# Patient Record
Sex: FEMALE | Race: WHITE | NOT HISPANIC OR LATINO | Employment: STUDENT | URBAN - METROPOLITAN AREA
[De-identification: names, ages, dates, MRNs, and addresses within clinical notes are randomized per-mention and may not be internally consistent; named-entity substitution may affect disease eponyms.]

---
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
## 2022-08-22 PROBLEM — Z34.90 PREGNANCY: Status: ACTIVE | Noted: 2022-08-22

## 2022-08-22 PROBLEM — R11.2 NAUSEA AND VOMITING: Status: ACTIVE | Noted: 2022-08-22

## 2022-09-02 ENCOUNTER — PROCEDURE VISIT (OUTPATIENT)
Dept: OBSTETRICS AND GYNECOLOGY | Facility: CLINIC | Age: 22
End: 2022-09-02
Payer: COMMERCIAL

## 2022-09-02 ENCOUNTER — INITIAL PRENATAL (OUTPATIENT)
Dept: OBSTETRICS AND GYNECOLOGY | Facility: CLINIC | Age: 22
End: 2022-09-02
Payer: COMMERCIAL

## 2022-09-02 VITALS — DIASTOLIC BLOOD PRESSURE: 72 MMHG | WEIGHT: 143.75 LBS | SYSTOLIC BLOOD PRESSURE: 114 MMHG

## 2022-09-02 DIAGNOSIS — Z34.90 PREGNANCY, UNSPECIFIED GESTATIONAL AGE: Primary | ICD-10-CM

## 2022-09-02 LAB
ABO + RH BLD: NORMAL
ANION GAP SERPL CALC-SCNC: 10 MMOL/L (ref 8–16)
BASOPHILS # BLD AUTO: 0.02 K/UL (ref 0–0.2)
BASOPHILS NFR BLD: 0.3 % (ref 0–1.9)
BLD GP AB SCN CELLS X3 SERPL QL: NORMAL
BUN SERPL-MCNC: 9 MG/DL (ref 6–20)
CALCIUM SERPL-MCNC: 9.3 MG/DL (ref 8.7–10.5)
CHLORIDE SERPL-SCNC: 108 MMOL/L (ref 95–110)
CO2 SERPL-SCNC: 21 MMOL/L (ref 23–29)
CREAT SERPL-MCNC: 0.7 MG/DL (ref 0.5–1.4)
DIFFERENTIAL METHOD: NORMAL
EOSINOPHIL # BLD AUTO: 0 K/UL (ref 0–0.5)
EOSINOPHIL NFR BLD: 0.5 % (ref 0–8)
ERYTHROCYTE [DISTWIDTH] IN BLOOD BY AUTOMATED COUNT: 12.6 % (ref 11.5–14.5)
EST. GFR  (NO RACE VARIABLE): >60 ML/MIN/1.73 M^2
GLUCOSE SERPL-MCNC: 82 MG/DL (ref 70–110)
HCT VFR BLD AUTO: 45 % (ref 37–48.5)
HGB BLD-MCNC: 15.1 G/DL (ref 12–16)
IMM GRANULOCYTES # BLD AUTO: 0.02 K/UL (ref 0–0.04)
IMM GRANULOCYTES NFR BLD AUTO: 0.3 % (ref 0–0.5)
LYMPHOCYTES # BLD AUTO: 1.8 K/UL (ref 1–4.8)
LYMPHOCYTES NFR BLD: 23.8 % (ref 18–48)
MCH RBC QN AUTO: 30.3 PG (ref 27–31)
MCHC RBC AUTO-ENTMCNC: 33.6 G/DL (ref 32–36)
MCV RBC AUTO: 90 FL (ref 82–98)
MONOCYTES # BLD AUTO: 0.4 K/UL (ref 0.3–1)
MONOCYTES NFR BLD: 5.5 % (ref 4–15)
NEUTROPHILS # BLD AUTO: 5.2 K/UL (ref 1.8–7.7)
NEUTROPHILS NFR BLD: 69.6 % (ref 38–73)
NRBC BLD-RTO: 0 /100 WBC
PLATELET # BLD AUTO: 315 K/UL (ref 150–450)
PMV BLD AUTO: 10.1 FL (ref 9.2–12.9)
POTASSIUM SERPL-SCNC: 3.9 MMOL/L (ref 3.5–5.1)
RBC # BLD AUTO: 4.99 M/UL (ref 4–5.4)
SODIUM SERPL-SCNC: 139 MMOL/L (ref 136–145)
WBC # BLD AUTO: 7.4 K/UL (ref 3.9–12.7)

## 2022-09-02 PROCEDURE — 80048 BASIC METABOLIC PNL TOTAL CA: CPT | Performed by: ADVANCED PRACTICE MIDWIFE

## 2022-09-02 PROCEDURE — 99999 PR PBB SHADOW E&M-EST. PATIENT-LVL III: ICD-10-PCS | Mod: PBBFAC,,, | Performed by: ADVANCED PRACTICE MIDWIFE

## 2022-09-02 PROCEDURE — 87086 URINE CULTURE/COLONY COUNT: CPT | Performed by: ADVANCED PRACTICE MIDWIFE

## 2022-09-02 PROCEDURE — 99203 PR OFFICE/OUTPT VISIT, NEW, LEVL III, 30-44 MIN: ICD-10-PCS | Mod: S$GLB,,, | Performed by: ADVANCED PRACTICE MIDWIFE

## 2022-09-02 PROCEDURE — 86850 RBC ANTIBODY SCREEN: CPT | Performed by: ADVANCED PRACTICE MIDWIFE

## 2022-09-02 PROCEDURE — 99999 PR PBB SHADOW E&M-EST. PATIENT-LVL III: CPT | Mod: PBBFAC,,, | Performed by: ADVANCED PRACTICE MIDWIFE

## 2022-09-02 PROCEDURE — 99203 OFFICE O/P NEW LOW 30 MIN: CPT | Mod: S$GLB,,, | Performed by: ADVANCED PRACTICE MIDWIFE

## 2022-09-02 PROCEDURE — 87591 N.GONORRHOEAE DNA AMP PROB: CPT | Performed by: ADVANCED PRACTICE MIDWIFE

## 2022-09-02 PROCEDURE — 86592 SYPHILIS TEST NON-TREP QUAL: CPT | Performed by: ADVANCED PRACTICE MIDWIFE

## 2022-09-02 PROCEDURE — 85025 COMPLETE CBC W/AUTO DIFF WBC: CPT | Performed by: ADVANCED PRACTICE MIDWIFE

## 2022-09-02 PROCEDURE — 87340 HEPATITIS B SURFACE AG IA: CPT | Performed by: ADVANCED PRACTICE MIDWIFE

## 2022-09-02 PROCEDURE — 86762 RUBELLA ANTIBODY: CPT | Performed by: ADVANCED PRACTICE MIDWIFE

## 2022-09-02 PROCEDURE — 86803 HEPATITIS C AB TEST: CPT | Performed by: ADVANCED PRACTICE MIDWIFE

## 2022-09-02 PROCEDURE — 87389 HIV-1 AG W/HIV-1&-2 AB AG IA: CPT | Performed by: ADVANCED PRACTICE MIDWIFE

## 2022-09-02 PROCEDURE — 87491 CHLMYD TRACH DNA AMP PROBE: CPT | Performed by: ADVANCED PRACTICE MIDWIFE

## 2022-09-02 NOTE — PROGRESS NOTES
HISTORY OF PRESENT ILLNESS:    Yenifer Quezada is a 22 y.o. female, ,  Patient's last menstrual period was 2022.  for a routine exam complaining of amenorrhea.   Pt reports her cycles are irregular     History reviewed. No pertinent past medical history.    Past Surgical History:   Procedure Laterality Date    KNEE SURGERY Left 2015    Dr King        MEDICATIONS AND ALLERGIES:      Current Outpatient Medications:     ondansetron (ZOFRAN) 4 MG tablet, Take 1 tablet (4 mg total) by mouth every 6 (six) hours., Disp: 18 tablet, Rfl: 0    prenatal 105-iron-folic ac-dha 30 mg iron- 1.4 mg-300 mg Cmpk, Take by mouth., Disp: , Rfl:     Review of patient's allergies indicates:   Allergen Reactions    Pcn [penicillins] Rash       Family History   Problem Relation Age of Onset    Breast cancer Paternal Grandmother     No Known Problems Father     No Known Problems Mother     No Known Problems Brother     Colon cancer Neg Hx     Ovarian cancer Neg Hx        Social History     Socioeconomic History    Marital status: Single   Tobacco Use    Smoking status: Never    Smokeless tobacco: Never   Substance and Sexual Activity    Alcohol use: No    Drug use: No    Sexual activity: Yes     Partners: Male       COMPREHENSIVE GYN HISTORY:  PAP History: Denies abnormal Paps.  Infection History: Denies STDs. Denies PID.  Benign History: Denies uterine fibroids. Denies ovarian cysts. Denies endometriosis. Denies other conditions.  Cancer History: Denies cervical cancer. Denies uterine cancer or hyperplasia. Denies ovarian cancer. Denies vulvar cancer or pre-cancer. Denies vaginal cancer or pre-cancer. Denies breast cancer. Denies colon cancer.  Sexual Activity History: Reports currently being sexually active  Menstrual History: None.  Contraception: None    ROS:  GENERAL: No weight changes. No swelling. No fatigue. No fever.  CARDIOVASCULAR: No chest pain. No shortness of breath. No leg cramps.    NEUROLOGICAL: No headaches. No vision changes.  BREASTS: No pain. No lumps. No discharge.  ABDOMEN: No pain. No nausea. No vomiting. No diarrhea. No constipation.  REPRODUCTIVE: No abnormal bleeding.   VULVA: No pain. No lesions. No itching.  VAGINA: No relaxation. No itching. No odor. No discharge. No lesions.  URINARY: No incontinence. No nocturia. No frequency. No dysuria.    /72   Wt 65.2 kg (143 lb 11.8 oz)   LMP 2022     PE:  AFFECT: Alert and oriented X 3. Interactive during exam  GENERAL: Appearance well-nourished, well-developed, in no acute distress.  HEENT: WNL  TEETH: Good dentition.  THYROID: No thyromegally   BREASTS: No masses, skin changes, nipple discharge or adenopathy bilaterally.  LUNGS: Easy and unlabored  HEART: Regular rate and rhythm   ABDOMEN: Soft and nontender without masses or organomegally.  EXTREMITIES: No cyanosis, clubbing or edema. No calf tenderness.  SKIN: No lesions or rashes.  LYMPH NODES: No axillary,  or inguinal adenopathy.  VULVA: No lesions, masses or tenderness.  VAGINA: Moist and well rugated without lesions or discharge.  CERVIX: Moist and pink without lesions, discharge or tenderness.      UTERUS SIZE:   week size, nontender and without masses.  ADNEXA: No masses or tenderness.  RECTUM: Deferred.  ESTIMATE OF PELVIC CAPACITY: Adequate    PROCEDURES:  UPT Positive  GC/chlam per urine        DIAGNOSIS:  Gyn exam  IUP with stated LMP of 22- dating scan ordered    PLAN:Routine prenatal care    MEDICATIONS PRESCRIBED:  PNV    LABS AND TESTS ORDERED:  New Ob Labs  Dating scan  Connected Mom      1st TRIMESTER COUNSELING: Discussed all, booklet provided  Common complaints of pregnancy  HIV and other routine prenatal tests including  genetic screening  Risk factors identified by prenatal history  Anticipated course of prenatal care  Nutrition and weight gain counseling  Toxoplasmosis precautions (Cats/Raw Meat)  Sexual activity and  exercise  Environmental/Work hazards  Travel  Tobacco (Ask, Advise, Assess, Assist, and Arrange), as well as alcohol and drug use  Use of any medications (Including supplements, Vitamins, Herbs, or OTC Drugs)  Indications for Ultrasound  Domestic violence  Seat belt use  Childbirth classes/Hospital facilities     TERATOLOGY COUNSELING: Discussed options for genetic screening, will order p dating scan                                                              FOLLOW-UP for a New Ob Visit in 2  weeks

## 2022-09-03 LAB
C TRACH DNA SPEC QL NAA+PROBE: NOT DETECTED
HBV SURFACE AG SERPL QL IA: NORMAL
HCV AB SERPL QL IA: NORMAL
HIV 1+2 AB+HIV1 P24 AG SERPL QL IA: NORMAL
N GONORRHOEA DNA SPEC QL NAA+PROBE: NOT DETECTED
RPR SER QL: NORMAL

## 2022-09-04 LAB — BACTERIA UR CULT: NORMAL

## 2022-09-06 ENCOUNTER — TELEPHONE (OUTPATIENT)
Dept: OBSTETRICS AND GYNECOLOGY | Facility: CLINIC | Age: 22
End: 2022-09-06
Payer: COMMERCIAL

## 2022-09-06 LAB
RUBV IGG SER-ACNC: 46.9 IU/ML
RUBV IGG SER-IMP: REACTIVE

## 2022-09-06 NOTE — TELEPHONE ENCOUNTER
----- Message from Talita Medina sent at 9/6/2022  3:21 PM CDT -----  Regarding: ultrasound  Name of Who is Calling:NATALY KELLER [[id number]]          What is the request in detail: patient is requesting a call back in reference to an ultrasound           Can the clinic reply by [facility]: no           What Number to Call Back if not in [facility]: [phone]

## 2022-09-06 NOTE — TELEPHONE ENCOUNTER
Spoke with pt, informed her that MFM will reach out in getting appt scheduled for U/S. Pt stated that someone had contacted her and set up an appt on 09/16/2022

## 2022-09-08 ENCOUNTER — PATIENT MESSAGE (OUTPATIENT)
Dept: OBSTETRICS AND GYNECOLOGY | Facility: CLINIC | Age: 22
End: 2022-09-08
Payer: COMMERCIAL

## 2022-09-16 ENCOUNTER — HOSPITAL ENCOUNTER (OUTPATIENT)
Dept: PERINATAL CARE | Facility: OTHER | Age: 22
Discharge: HOME | End: 2022-09-16
Attending: PEDIATRICS
Payer: COMMERCIAL

## 2022-09-16 DIAGNOSIS — Z34.90 PREGNANCY, UNSPECIFIED GESTATIONAL AGE: ICD-10-CM

## 2022-09-16 DIAGNOSIS — Z36.89 ESTABLISH GESTATIONAL AGE, ULTRASOUND: Primary | ICD-10-CM

## 2022-09-16 DIAGNOSIS — Z36.89 ESTABLISH GESTATIONAL AGE, ULTRASOUND: ICD-10-CM

## 2022-09-16 PROCEDURE — 76801 OB US < 14 WKS SINGLE FETUS: CPT | Mod: 26,,, | Performed by: OBSTETRICS & GYNECOLOGY

## 2022-09-16 PROCEDURE — 76801 OB US < 14 WKS SINGLE FETUS: CPT

## 2022-09-16 PROCEDURE — 76801 US OB/GYN PROCEDURE (VIEWPOINT): ICD-10-PCS | Mod: 26,,, | Performed by: OBSTETRICS & GYNECOLOGY

## 2022-09-23 ENCOUNTER — ROUTINE PRENATAL (OUTPATIENT)
Dept: OBSTETRICS AND GYNECOLOGY | Facility: CLINIC | Age: 22
End: 2022-09-23
Payer: COMMERCIAL

## 2022-09-23 VITALS — SYSTOLIC BLOOD PRESSURE: 114 MMHG | DIASTOLIC BLOOD PRESSURE: 72 MMHG | WEIGHT: 143.5 LBS

## 2022-09-23 DIAGNOSIS — Z34.90 ENCOUNTER FOR SUPERVISION OF LOW-RISK PREGNANCY, ANTEPARTUM: Primary | ICD-10-CM

## 2022-09-23 PROCEDURE — 0502F PR SUBSEQUENT PRENATAL CARE: ICD-10-PCS | Mod: CPTII,S$GLB,, | Performed by: OBSTETRICS & GYNECOLOGY

## 2022-09-23 PROCEDURE — 99999 PR PBB SHADOW E&M-EST. PATIENT-LVL III: ICD-10-PCS | Mod: PBBFAC,,, | Performed by: OBSTETRICS & GYNECOLOGY

## 2022-09-23 PROCEDURE — 99999 PR PBB SHADOW E&M-EST. PATIENT-LVL III: CPT | Mod: PBBFAC,,, | Performed by: OBSTETRICS & GYNECOLOGY

## 2022-09-23 PROCEDURE — 0502F SUBSEQUENT PRENATAL CARE: CPT | Mod: CPTII,S$GLB,, | Performed by: OBSTETRICS & GYNECOLOGY

## 2022-09-23 NOTE — PROGRESS NOTES
Pregnancy dating, labs, ultrasound reports, prenatal testing, and problem list; prior records and results; and available outside records were reviewed and updated in EMR.  Pertinent findings were noted below.    Reason for Visit: Routine Prenatal Visit    10w3d by Estimated Date of Delivery: 4/18/23    Blood pressure 114/72, weight 65.1 kg (143 lb 8.3 oz), last menstrual period 05/12/2022.    Encounter for supervision of low-risk pregnancy, antepartum  -     US MFM Procedure ([facility]); Future; Expected date: 11/18/2022       No cramping or bleeding.  Not yet feeling fetal movement.  Labs reviewed and up to date.   Declines aneuploidy screening after counseling   Anatomy US ordered   Do's and Don'ts of pregnancy reviewed    Pain and bleeding precautions given  Follow-up: 4 weeks

## 2022-09-23 NOTE — PATIENT INSTRUCTIONS
Dos and Donts in Pregnancy    Prenatal Vitamins  Pregnant women should consume the following each day through diet or supplements:  o Folic acid 400-800 micrograms (until the end of the first trimester)  o Iron 30 mg (or be screened for anemia)  o Vitamin D 600 international units  o Calcium 1,000 mg  Prenatal vitamins may be used to ensure adequate consumption of several vitamins and minerals in pregnancy. However, their necessity for all pregnant women is uncertain, especially for women with well-balanced diets. There is no known ideal formulation for a prenatal vitamin.    Nutrition and Weight Gain  Pregnant women should be advised to eat a healthy, well-balanced diet and typically should increase their caloric intake by a small amount (350-450 calories/d). Women with higher prepregnancy BMIs do not need to gain the same amount of weight as women with normal or low BMIs.    Alcohol  Although current data suggest that consumption of small amounts of alcohol during pregnancy (less than seven to nine drinks/wk) does not appear to be harmful to the fetus, the exact threshold between safe and unsafe, if it exists, is unknown. Therefore, alcohol should be avoided in pregnancy.    Artificial Sweeteners  Artificial sweeteners can be used in pregnancy. Data regarding saccharin are conflicting. Low (typical) consumption is likely safe.    Caffeine  Low-to-moderate caffeine intake in pregnancy does not appear to be associated with any adverse outcomes. Pregnant women may have caffeine but should probably limit it to less than 300 mg/d (a typical 8-ounce cup of brewed coffee has approximately 130 mg of caffeine. An 8-ounce cup of tea or 12-ounce soda has approximately 50 mg of caffeine), but exact amounts vary based on the specific beverage or food.    Fish Consumption  Pregnant women should try to consume two to three servings per week of fish with a high DHA and low mercury content. For women who do not achieve this, it  is unknown whether DHA and n-3 PUFA supplementation are beneficial, but they are unlikely to be harmful.    Raw and Undercooked Fish  In line with current recommendations, pregnant women should generally avoid undercooked fish. However, sushi that was prepared in a clean and reputable establishment is unlikely to pose a risk to the pregnancy.    Other Foods to Avoid  Pregnant women should avoid raw and undercooked meat. Pregnant women should wash vegetables and fruit before eating them. Pregnant women should avoid unpasteurized dairy products.  Unheated deli meats could also potentially increase the risk of Listeria, but the risk in recent years in uncertain. Pregnant women should avoid foods that are being recalled for possible Listeria contamination.    Smoking, Nicotine, and Vaping  Women should not smoke cigarettes during pregnancy. If they are unable to quit entirely, they should reduce it as much as possible. Nicotine replacement (with patches or gum) is appropriate as part of a smoking cessation strategy.    Marijuana  Marijuana use is not known to be associated with any adverse outcomes in pregnancy. However, data regarding long-term neurodevelopmental outcomes are lacking; therefore, marijuana use is currently not recommended in pregnancy.    Exercise and Bedrest  Pregnant women should be encouraged to exercise regularly. There is no known benefit to activity restriction or bedrest for pregnant women.    Avoiding Injury  Pregnant women should wear lap and shoulder seatbelts while in a motor vehicle and should not disable their airbags.    Oral Health  Oral health and dental procedures can continue as scheduled during pregnancy.    Hot Tubs and Swimming  Although data are limited, pregnant women should probably avoid hot tub use in the first trimester. Swimming pool use should not be discouraged in pregnancy.    Insect Repellants  Topical insect repellants (including DEET) can be used in pregnancy and should  be used in areas with high risk for insect-borne illnesses.    Hair Dyes  Although data are limited, because systemic absorption is minimal, hair dye is presumed to be safe in pregnancy.    Travel  Airline travel is safe in pregnancy. Pregnant women should be familiar with the infection exposures and available medical care for each specific destination. There is no exact gestational age at which women must stop travel. Each pregnant woman must balance the benefit of the trip with the potential of a complication at her destination.    Sexual [place]  Pregnant women without bleeding, placenta previa at greater than 20 weeks of gestation, or ruptured membranes should not have restrictions regarding sexual intercourse.    Sleeping Position  It is currently unknown whether, and at what gestational age, pregnant women should be advised to sleep on their side.

## 2022-09-26 ENCOUNTER — PATIENT MESSAGE (OUTPATIENT)
Dept: OBSTETRICS AND GYNECOLOGY | Facility: CLINIC | Age: 22
End: 2022-09-26
Payer: COMMERCIAL

## 2022-10-03 ENCOUNTER — PATIENT MESSAGE (OUTPATIENT)
Dept: OBSTETRICS AND GYNECOLOGY | Facility: CLINIC | Age: 22
End: 2022-10-03
Payer: COMMERCIAL

## 2022-10-03 DIAGNOSIS — O21.9 NAUSEA AND VOMITING IN PREGNANCY: Primary | ICD-10-CM

## 2022-10-03 RX ORDER — ONDANSETRON 4 MG/1
4 TABLET, FILM COATED ORAL DAILY PRN
Qty: 30 TABLET | Refills: 1 | Status: SHIPPED | OUTPATIENT
Start: 2022-10-03 | End: 2022-11-09 | Stop reason: SDUPTHER

## 2022-10-18 ENCOUNTER — ROUTINE PRENATAL (OUTPATIENT)
Dept: OBSTETRICS AND GYNECOLOGY | Facility: CLINIC | Age: 22
End: 2022-10-18
Payer: COMMERCIAL

## 2022-10-18 VITALS — WEIGHT: 149.5 LBS | SYSTOLIC BLOOD PRESSURE: 104 MMHG | DIASTOLIC BLOOD PRESSURE: 62 MMHG

## 2022-10-18 DIAGNOSIS — Z34.90 ENCOUNTER FOR SUPERVISION OF LOW-RISK PREGNANCY, ANTEPARTUM: Primary | ICD-10-CM

## 2022-10-18 PROCEDURE — 0502F SUBSEQUENT PRENATAL CARE: CPT | Mod: CPTII,S$GLB,, | Performed by: OBSTETRICS & GYNECOLOGY

## 2022-10-18 PROCEDURE — 99999 PR PBB SHADOW E&M-EST. PATIENT-LVL II: ICD-10-PCS | Mod: PBBFAC,,, | Performed by: OBSTETRICS & GYNECOLOGY

## 2022-10-18 PROCEDURE — 0502F PR SUBSEQUENT PRENATAL CARE: ICD-10-PCS | Mod: CPTII,S$GLB,, | Performed by: OBSTETRICS & GYNECOLOGY

## 2022-10-18 PROCEDURE — 99999 PR PBB SHADOW E&M-EST. PATIENT-LVL II: CPT | Mod: PBBFAC,,, | Performed by: OBSTETRICS & GYNECOLOGY

## 2022-10-18 NOTE — PROGRESS NOTES
Pregnancy dating, labs, ultrasound reports, prenatal testing, and problem list; prior records and results; and available outside records were reviewed and updated in EMR.  Pertinent findings were noted below.    Reason for Visit   Routine Prenatal Visit    HPI   22 y.o., at 14w0d by Estimated Date of Delivery: 4/18/23      Cramping: No   Bleeding: No   Vaginal discharge: No   Fetal movement: No   Nausea: No   Vomiting: No   Headache: No     Exam   /62   Wt 67.8 kg (149 lb 7.6 oz)   LMP 05/12/2022     GENERAL: No acute distress  ABD: Gravid < umbilicus    Assessment and Plan   Encounter for supervision of low-risk pregnancy, antepartum         Patient has anatomy scan scheduled. Declines aneuploidy screening.     SAB precautions given  Follow-up: 4 weeks    Stephon Macdonald MD  PGY-1 OBGYN

## 2022-10-21 NOTE — PROGRESS NOTES
Lab Documentation:    Order Type: Written Order placed in [facility]    Patient in for lab visit only per provider treatment plan.

## 2022-11-15 ENCOUNTER — ROUTINE PRENATAL (OUTPATIENT)
Dept: OBSTETRICS AND GYNECOLOGY | Facility: CLINIC | Age: 22
End: 2022-11-15
Payer: COMMERCIAL

## 2022-11-15 VITALS — DIASTOLIC BLOOD PRESSURE: 62 MMHG | WEIGHT: 158.31 LBS | SYSTOLIC BLOOD PRESSURE: 110 MMHG

## 2022-11-15 DIAGNOSIS — Z34.90 ENCOUNTER FOR SUPERVISION OF LOW-RISK PREGNANCY, ANTEPARTUM: Primary | ICD-10-CM

## 2022-11-15 PROCEDURE — 0502F PR SUBSEQUENT PRENATAL CARE: ICD-10-PCS | Mod: CPTII,S$GLB,, | Performed by: OBSTETRICS & GYNECOLOGY

## 2022-11-15 PROCEDURE — 99999 PR PBB SHADOW E&M-EST. PATIENT-LVL II: ICD-10-PCS | Mod: PBBFAC,,, | Performed by: OBSTETRICS & GYNECOLOGY

## 2022-11-15 PROCEDURE — 0502F SUBSEQUENT PRENATAL CARE: CPT | Mod: CPTII,S$GLB,, | Performed by: OBSTETRICS & GYNECOLOGY

## 2022-11-15 PROCEDURE — 99999 PR PBB SHADOW E&M-EST. PATIENT-LVL II: CPT | Mod: PBBFAC,,, | Performed by: OBSTETRICS & GYNECOLOGY

## 2022-11-15 NOTE — PROGRESS NOTES
Pregnancy dating, labs, ultrasound reports, prenatal testing, and problem list; prior records and results; and available outside records were reviewed and updated in EMR.  Pertinent findings were noted below.    Reason for Visit: Routine Prenatal Visit    18w0d by Estimated Date of Delivery: 23    Blood pressure 110/62, weight 71.8 kg (158 lb 4.6 oz), last menstrual period 2022.  TWG 13 lbs  FHT: 155bpm    Encounter for supervision of low-risk pregnancy, antepartum       No cramping or bleeding.  Not yet feeling fetal movement. - feeling flutters. Labs reviewed and up to date.   Anatomy US scheduled      labor precautions given  Follow-up: 4 weeks    Juanjo MARCOS     A student assisted me with documenting this service.  I saw the patient and performed the history, physical exam, and medical decision making.  I reviewed and verified all information documented by the student and made modifications to such documentation when appropriate.  GILBERTO Velázquez MD

## 2022-11-18 ENCOUNTER — PATIENT MESSAGE (OUTPATIENT)
Dept: MATERNAL FETAL MEDICINE | Facility: CLINIC | Age: 22
End: 2022-11-18
Payer: COMMERCIAL

## 2022-11-21 ENCOUNTER — TELEPHONE (OUTPATIENT)
Dept: INTENSIVE CARE | Facility: OTHER | Age: 22
End: 2022-11-21
Payer: COMMERCIAL

## 2022-11-21 NOTE — TELEPHONE ENCOUNTER
PRIME  Sponsor: [employer]  Study: Prematurity Risk Assessment combined with Clinical Interventions for Improving  OutcoMEs  IRB/Protocol #: [id number]   Principle Investigator/ Sub-Investigator: Gilmer Velasco MD  Site Number: [id number]  Location: [facility]    I called Ms Quezada to let her know about the Prime Clinical Trial but there was no answer and no way to leave a message.

## 2022-11-22 ENCOUNTER — OFFICE VISIT (OUTPATIENT)
Dept: MATERNAL FETAL MEDICINE | Facility: CLINIC | Age: 22
End: 2022-11-22
Payer: COMMERCIAL

## 2022-11-22 ENCOUNTER — PROCEDURE VISIT (OUTPATIENT)
Dept: MATERNAL FETAL MEDICINE | Facility: CLINIC | Age: 22
End: 2022-11-22
Payer: COMMERCIAL

## 2022-11-22 DIAGNOSIS — O28.3 ECHOGENIC INTRACARDIAC FOCUS OF FETUS ON PRENATAL ULTRASOUND: Primary | ICD-10-CM

## 2022-11-22 DIAGNOSIS — Z34.90 ENCOUNTER FOR SUPERVISION OF LOW-RISK PREGNANCY, ANTEPARTUM: ICD-10-CM

## 2022-11-22 DIAGNOSIS — Z36.2 ENCOUNTER FOR FOLLOW-UP ULTRASOUND OF FETAL ANATOMY: Primary | ICD-10-CM

## 2022-11-22 PROCEDURE — 96040 PR GENETIC COUNSELING, EACH 30 MIN: ICD-10-PCS | Mod: S$GLB,,, | Performed by: GENETIC COUNSELOR, MS

## 2022-11-22 PROCEDURE — 76811 OB US DETAILED SNGL FETUS: CPT | Mod: S$GLB,,, | Performed by: OBSTETRICS & GYNECOLOGY

## 2022-11-22 PROCEDURE — 76811 US MFM PROCEDURE (VIEWPOINT): ICD-10-PCS | Mod: S$GLB,,, | Performed by: OBSTETRICS & GYNECOLOGY

## 2022-11-22 PROCEDURE — 96040 PR GENETIC COUNSELING, EACH 30 MIN: CPT | Mod: S$GLB,,, | Performed by: GENETIC COUNSELOR, MS

## 2022-11-22 PROCEDURE — 99499 UNLISTED E&M SERVICE: CPT | Mod: S$GLB,,, | Performed by: GENETIC COUNSELOR, MS

## 2022-11-22 PROCEDURE — 99499 NO LOS: ICD-10-PCS | Mod: S$GLB,,, | Performed by: GENETIC COUNSELOR, MS

## 2022-11-22 NOTE — PROGRESS NOTES
Office Visit - Genetic Counseling Evaluation   Yenifer Quezada  : 2000  MRN: [id number]  PARTNER NAME: Juancarlos    DATE OF SERVICE: 22  TIME SPENT: 25 min    REFERRING PROVIDER: Dr. Flores Cat    REASON FOR CONSULT:  Yenifer Quezada, a 22 y.o. female with recently identified echogenic intracardiac focus on today's anatomical ultrasound was referred for genetic counseling to discuss genetic associations with finding and options for screening. She came to the appointment with her mother.       OBSETRIC HISTORY   AGE AT MARIANELA: 22y  MARIANELA: 2023  GESTATION: [place]  GESTATIONAL AGE:19w0d    PREGNANCY HISTORY  G1: current    MEDICAL HISTORY:    MEDICATIONS/EXPOSURES: not discussed    Patient Active Problem List   Diagnosis    Tears of meniscus and ACL of left knee    Swelling of left knee joint    Complete tear of right ACL    Pregnancy    Nausea and vomiting         Current Outpatient Medications:     ondansetron (ZOFRAN) 4 MG tablet, Take 1 tablet (4 mg total) by mouth daily as needed for Nausea., Disp: 30 tablet, Rfl: 1    prenatal 105-iron-folic ac-dha 30 mg iron- 1.4 mg-300 mg Cmpk, Take by mouth., Disp: , Rfl:      FAMILY HISTORY:  Please see scanned pedigree in patient's chart under media. Yenifer has three brothers with no health problems, her mother is also healthy. Her father has high blood pressure and diabetes. Her partner Juancarlos is also healthy, he has two maternal half sisters who are healthy. His mother is doing well and the family has little information about his father.     Patient's ancestry is [place] and [place]. FOB ancestry is [place]. Consanguinity was denied.     Additional history negative for multiple miscarriages/stillbirth, developmental delay/intellectual disability, and known genetic disorders. Complete pedigree will be linked to this encounter and can be viewed under the Media tab. The information provided is based on the patient and/or their reproductive partner's  recollection of the family history and in the absence of complete medical records. If the family history changes or if more information is obtained, they were asked to contact us as this may alter the recommendations or impression of the family history.     PAST TESTING  Patient carrier screening: Not discussed    Reproductive partner carrier screening: NA    Parental Karyotypes: NA    PREGNANCY TESTING  cfDNA for aneuploidy: Not completed    Diagnostic testing: NA    Fetal karyotype: NA    COUNSELING:   ECHOGENIC INTRACARDIAC FOCUS (EIF)  We discussed echogenic intracardac focus (EIF). This is not a structural abnormality that requires post-lubna follow-up and is not associated with congenital heart disease. It is usually seen as a normal variant in about 5% of pregnancies. There is a higher incidence of EIF in fetus's with Down Syndrome than in chromosomally typical fetus' therefore it is considered to be risk factor for Down syndrome (Likelihood ratio of 2). If isolated, the overwhelming majority (99%) of fetuses found to have an EIF will not have Down syndrome.      In a woman with other risk factors for Down syndrome (advanced maternal age, elevated quad screen risk or presence of other markers), the presence of an echogenic focus may increase the relative risk of Down syndrome. If the patient is close to age 35, the presence of an echogenic focus may increase the risk for Down syndrome to a level seen in women 35 years old or older. In a younger woman (< age 35) or in a woman without other risk factors or markers for Down syndrome, the significance of an isolated echogenic focus is uncertain and often not significant.      We reviewed the options of NIPT and amniocentesis. We reviewed the sensitivity and specificity and detection rates for Trisomy 21 for each of these tests. We discussed a 1 in 900 risk of pregnancy related loss or complication with an amniocentesis    DISCUSSION & IMPRESSION:  Yenifer is a 22  y.o. female with a recently identified EIF on fetal anatomical ultrasound. She has not had any screening prior to this finding and we discussed the above options. She had previously declined because of the potential for a false positive/negative. Today she stated that she would be interested in completing this testing given the ultrasound findings.     TESTING OPTIONS    Diagnostic Testing: Amniocentesis    Carrier Screening:NA    cfDNA screening: [id number] offered    Pregnancy Options: Termination was reviewed briefly for the purposes of anticipatory guidance    Recurrence Risk: NA    Procedures/labs DECLINED today: Amniocentesis    Yenifer stated that she would like to complete cfDNA screening via [id number], she does not want to know fetal sex.     We reviewed Yenifer's medical and family history. We discussed basics of genetics and ultrasound markers like EIF's. Yenifer was understanding of the information discussed in clinic and all questions were answered.     Per the indication for referral this visit was conducted by a genetic counselor. This patient will not be seen by an [facility] physician and your patient will not be scheduled for additional visits. If you have questions or concerns about this please reach out to our clinic and let us know any additional concerns you hope to be addressed by the maternal fetal medicine physicians that may be out of the scope of this visit. Thank you for your referral and the opportunity to participate in the care of your patients.       RECOMMENDATIONS:  [id number]- ordered and drawn today    Marilia Perkins MS, [place]  Licensed, Certified Genetic Counselor  [facility]

## 2022-11-29 ENCOUNTER — TELEPHONE (OUTPATIENT)
Dept: MATERNAL FETAL MEDICINE | Facility: CLINIC | Age: 22
End: 2022-11-29
Payer: COMMERCIAL

## 2022-11-29 NOTE — TELEPHONE ENCOUNTER
Yenifer's [id number] test returned with negative results. This reduces, though does not eliminate the chance that this pregnancy could be affected by Down syndrome or another chromosome anomaly. Yenifer is returning in a couple of weeks to complete her anatomy ultrasound and asked questions about what would be looked at during this scan. We reviewed her expectations.     Marilia Perkins CGC

## 2022-12-12 ENCOUNTER — PATIENT MESSAGE (OUTPATIENT)
Dept: OBSTETRICS AND GYNECOLOGY | Facility: CLINIC | Age: 22
End: 2022-12-12
Payer: COMMERCIAL

## 2022-12-13 ENCOUNTER — ROUTINE PRENATAL (OUTPATIENT)
Dept: OBSTETRICS AND GYNECOLOGY | Facility: CLINIC | Age: 22
End: 2022-12-13
Payer: COMMERCIAL

## 2022-12-13 ENCOUNTER — CLINICAL SUPPORT (OUTPATIENT)
Dept: OBSTETRICS AND GYNECOLOGY | Facility: CLINIC | Age: 22
End: 2022-12-13
Payer: COMMERCIAL

## 2022-12-13 VITALS — DIASTOLIC BLOOD PRESSURE: 70 MMHG | WEIGHT: 160.5 LBS | SYSTOLIC BLOOD PRESSURE: 106 MMHG

## 2022-12-13 DIAGNOSIS — Z34.90 SUPERVISION OF LOW-RISK PREGNANCY, UNSPECIFIED TRIMESTER: Primary | ICD-10-CM

## 2022-12-13 DIAGNOSIS — O28.3 ECHOGENIC INTRACARDIAC FOCUS OF FETUS ON PRENATAL ULTRASOUND: ICD-10-CM

## 2022-12-13 DIAGNOSIS — Z23 NEEDS FLU SHOT: Primary | ICD-10-CM

## 2022-12-13 PROCEDURE — 90471 IMMUNIZATION ADMIN: CPT | Mod: S$GLB,,, | Performed by: OBSTETRICS & GYNECOLOGY

## 2022-12-13 PROCEDURE — 99999 PR PBB SHADOW E&M-EST. PATIENT-LVL I: ICD-10-PCS | Mod: PBBFAC,,,

## 2022-12-13 PROCEDURE — 90471 FLU VACCINE (QUAD) GREATER THAN OR EQUAL TO 3YO PRESERVATIVE FREE IM: ICD-10-PCS | Mod: S$GLB,,, | Performed by: OBSTETRICS & GYNECOLOGY

## 2022-12-13 PROCEDURE — 90686 IIV4 VACC NO PRSV 0.5 ML IM: CPT | Mod: S$GLB,,, | Performed by: OBSTETRICS & GYNECOLOGY

## 2022-12-13 PROCEDURE — 99999 PR PBB SHADOW E&M-EST. PATIENT-LVL I: CPT | Mod: PBBFAC,,,

## 2022-12-13 PROCEDURE — 99999 PR PBB SHADOW E&M-EST. PATIENT-LVL II: ICD-10-PCS | Mod: PBBFAC,,, | Performed by: OBSTETRICS & GYNECOLOGY

## 2022-12-13 PROCEDURE — 0503F POSTPARTUM CARE VISIT: CPT | Mod: CPTII,S$GLB,, | Performed by: OBSTETRICS & GYNECOLOGY

## 2022-12-13 PROCEDURE — 99999 PR PBB SHADOW E&M-EST. PATIENT-LVL II: CPT | Mod: PBBFAC,,, | Performed by: OBSTETRICS & GYNECOLOGY

## 2022-12-13 PROCEDURE — 90686 FLU VACCINE (QUAD) GREATER THAN OR EQUAL TO 3YO PRESERVATIVE FREE IM: ICD-10-PCS | Mod: S$GLB,,, | Performed by: OBSTETRICS & GYNECOLOGY

## 2022-12-13 PROCEDURE — 0503F PR POSTPARTUM CARE VISIT: ICD-10-PCS | Mod: CPTII,S$GLB,, | Performed by: OBSTETRICS & GYNECOLOGY

## 2022-12-13 NOTE — PROGRESS NOTES
Pregnancy dating, labs, ultrasound reports, prenatal testing, and problem list; prior records and results; and available outside records were reviewed and updated in EMR.  Pertinent findings were noted below.    Reason for Visit: Routine Prenatal Visit    22w0d by Estimated Date of Delivery: 23    Blood pressure 106/70, weight 72.8 kg (160 lb 7.9 oz), last menstrual period 2022.  TWG 15#    Supervision of low-risk pregnancy, unspecified trimester  -     OB Glucose Screen; Future; Expected date: 2022  -     CBC Auto Differential; Future; Expected date: 2022    Echogenic intracardiac focus of fetus on prenatal ultrasound      No contractions, bleeding, or loss of fluid.  +fetal movement.  Labs reviewed and up to date.  Glucose screen on RTC.  Flu vaccine today  Follow up anatomy US scheduled   EIF found on anatomy > mat 21 NEG, declines further testing     labor and Pain and bleeding precautions given  Follow-up: 4 weeks

## 2022-12-13 NOTE — PROGRESS NOTES
Here for Influenza - Quadrivalent - PF (ADULT) vaccine . Vaccine Information sheet given to patient. Patient without complaint of pain prior to or after injection. Immunization tolerated well and patient advised to wait in lobby 15 minutes. Report any adverse reactions.      Site: LD

## 2022-12-20 ENCOUNTER — PATIENT MESSAGE (OUTPATIENT)
Dept: MATERNAL FETAL MEDICINE | Facility: CLINIC | Age: 22
End: 2022-12-20
Payer: COMMERCIAL

## 2022-12-21 ENCOUNTER — OFFICE VISIT (OUTPATIENT)
Dept: MATERNAL FETAL MEDICINE | Facility: CLINIC | Age: 22
End: 2022-12-21
Attending: OBSTETRICS & GYNECOLOGY
Payer: COMMERCIAL

## 2022-12-21 ENCOUNTER — PROCEDURE VISIT (OUTPATIENT)
Dept: MATERNAL FETAL MEDICINE | Facility: CLINIC | Age: 22
End: 2022-12-21
Payer: COMMERCIAL

## 2022-12-21 ENCOUNTER — LAB VISIT (OUTPATIENT)
Dept: LAB | Facility: OTHER | Age: 22
End: 2022-12-21
Attending: OBSTETRICS & GYNECOLOGY
Payer: COMMERCIAL

## 2022-12-21 DIAGNOSIS — O35.9XX0 KNOWN FETAL ANOMALY, ANTEPARTUM, SINGLE OR UNSPECIFIED FETUS: ICD-10-CM

## 2022-12-21 DIAGNOSIS — Z3A.23 23 WEEKS GESTATION OF PREGNANCY: Primary | ICD-10-CM

## 2022-12-21 DIAGNOSIS — O35.09X0 PREGNANCY COMPLICATED BY FETAL CEREBRAL VENTRICULOMEGALY, SINGLE OR UNSPECIFIED FETUS: ICD-10-CM

## 2022-12-21 DIAGNOSIS — Z36.89 ENCOUNTER FOR ULTRASOUND TO CHECK FETAL GROWTH: Primary | ICD-10-CM

## 2022-12-21 DIAGNOSIS — Z36.2 ENCOUNTER FOR FOLLOW-UP ULTRASOUND OF FETAL ANATOMY: ICD-10-CM

## 2022-12-21 DIAGNOSIS — Z36.4 ULTRASOUND FOR ANTENATAL SCREENING FOR FETAL GROWTH RESTRICTION: ICD-10-CM

## 2022-12-21 DIAGNOSIS — O35.9XX0 KNOWN FETAL ANOMALY, ANTEPARTUM, SINGLE OR UNSPECIFIED FETUS: Primary | ICD-10-CM

## 2022-12-21 PROCEDURE — 86645 CMV ANTIBODY IGM: CPT | Performed by: OBSTETRICS & GYNECOLOGY

## 2022-12-21 PROCEDURE — 86644 CMV ANTIBODY: CPT | Performed by: OBSTETRICS & GYNECOLOGY

## 2022-12-21 PROCEDURE — 99215 OFFICE O/P EST HI 40 MIN: CPT | Mod: 25,S$GLB,, | Performed by: OBSTETRICS & GYNECOLOGY

## 2022-12-21 PROCEDURE — 36415 COLL VENOUS BLD VENIPUNCTURE: CPT | Performed by: OBSTETRICS & GYNECOLOGY

## 2022-12-21 PROCEDURE — 76816 OB US FOLLOW-UP PER FETUS: CPT | Mod: S$GLB,,, | Performed by: OBSTETRICS & GYNECOLOGY

## 2022-12-21 PROCEDURE — 99215 PR OFFICE/OUTPT VISIT, EST, LEVL V, 40-54 MIN: ICD-10-PCS | Mod: 25,S$GLB,, | Performed by: OBSTETRICS & GYNECOLOGY

## 2022-12-21 PROCEDURE — 76816 PR  US,PREGNANT UTERUS,F/U,TRANSABD APP: ICD-10-PCS | Mod: S$GLB,,, | Performed by: OBSTETRICS & GYNECOLOGY

## 2022-12-21 PROCEDURE — 86777 TOXOPLASMA ANTIBODY: CPT | Performed by: OBSTETRICS & GYNECOLOGY

## 2022-12-21 PROCEDURE — 86778 TOXOPLASMA ANTIBODY IGM: CPT | Performed by: OBSTETRICS & GYNECOLOGY

## 2022-12-21 NOTE — PROGRESS NOTES
MATERNAL-FETAL MEDICINE   CONSULT NOTE secondary to ultrasound evaluation today significant for bilateral mild fetal ventriculomegaly      SUBJECTIVE:     Ms. Yenifer Quezada is a 22 y.o.  female with IUP at 24w1d who is seen in consultation by RONY for evaluation and management of: bilateral mild fetal ventriculomegaly  Problem   Pregnancy Complicated By Fetal Cerebral Ventriculomegaly        Medication List with Changes/Refills   Current Medications    ONDANSETRON (ZOFRAN) 4 MG TABLET    Take 1 tablet (4 mg total) by mouth daily as needed for Nausea.    PRENATAL 105-IRON-FOLIC AC-DHA 30 MG IRON- 1.4 MG-300 MG CMPK    Take by mouth.     Review of patient's allergies indicates:   Allergen Reactions    Pcn [penicillins] Hives       PMH:No past medical history on file.    PObHx:  OB History    Para Term  AB Living   1 0 0 0 0 0   SAB IAB Ectopic Multiple Live Births   0 0 0 0 0      # Outcome Date GA Lbr Kwasi/2nd Weight Sex Delivery Anes PTL Lv   1 Current                PSH:  Past Surgical History:   Procedure Laterality Date    KNEE SURGERY Left 2015    Dr King        Family history:family history includes Breast cancer in her paternal grandmother; No Known Problems in her brother, father, and mother.    Social history: reports that she has never smoked. She has never used smokeless tobacco. She reports that she does not drink alcohol and does not use drugs.    Genetic history:  The patient denies any inherited genetic diseases or birth defects in herself or her partner's personal history or family.    Objective:   LMP 2022     Ultrasound performed. See viewpoint for full ultrasound report.  1. 23w 1d hay intrauterine pregnancy  2. Interval fetal growth wnl (EFW = 517 gms at 25% and AC 16%)  3. Fetal evaluation significant for:           - bilateral mild ventriculomegaly           -EIF  4. Amniotic fluid volume wnl per qualitative assessment   5. Subchorionic hemorrhage visualized  previous ultrasound: today, generalized subchorionic               fibrin deposition visualized       ASSESSMENT/PLAN:   Pregnancy complicated by fetal cerebral ventriculomegaly  Patient accompanied her mother     I  discussed today's ultrasound in detail with the patient. In particular bilateral mild fetal ventriculomegaly.     Ventriculomegaly is defined as a dilation of the fetal lateral cerebral ventricles ?10 mm at the atrial level, at any stage of pregnancy. It is the most common fetal brain abnormality, noted in about 1% of all pregnancies. Ventriculomegaly is categorized by the degree of dilation: mild (10-12 mm), moderate (13-15 mm), and severe (>15mm).    Ventriculomegaly can be associated with genetic abnormalities (most common being Trisomy 21), infectious causes, and conditions associated with increased risk for intracranial hemorrhage.    The degree of dilation, the progression of dilation, and the presence or absence of associated anomalies are the best fetal prognostic indicators. Ultrasound features that confer a higher risk for a pathologic condition include progressive dilation of the ventricles and asymmetric dilation of the ventricles.      Mild ventriculomegaly, which may be a normal variant, is associated with overall reassuring outcomes. Survival rate is near 98% and there is a greater than 90% likelihood of normal development. Moderate ventriculomegaly is associated with survival rates between 80-97%, when isolated, and 75-93% likelihood of normal development. Severe ventriculomegaly has a survival rate of 33% and a 6-8% likelihood of normal development.      Diagnostic testing via amniocentesis should be offered to all patients with ventriculomegaly and this was discussed today. The procedure-related pregnancy loss risk with amniocentesis was discussed with the patient.  Screening methods such as cell free DNA and serology for CMV, toxoplasmosis were discussed and offered to the patient.  (She has a cat but her  cleans the cat liter box).     No family history reported as positive for hydrocephalus in males, L1CAM mutation testing should be offered.     Patient and her mother's questions were answered in detail. Patient declined amniocentesis. She desires pursuit of cell free DNA screen, CMV/Toxo IgM and IgG. Patient will be scheduled for a f/u ultrasound and RT MFM MD visit.     Recommendations:  Serial ultrasounds about every 4 weeks (if isolated and mild, reasonable to repeat at 4 weeks and then once in early third trimester)  MFM will schedule next appt and with a RT MFM MD visit   Patient desires pursuit of CMV and Toxo IgM/IgG titers: MFM will assist patient and f/u results    Genetic counseling offered - patient pursuing cell free DNA screen and MFM staff will assist   Fetal echocardiogram and Pediatric Cardiology consultation at 22-26 weeks (only for isolated severe ventriculomegaly or any ventriculomegaly with associated findings).  Weekly  testing starting at 32 weeks (only for patients with isolated severe ventriculomegaly or any ventriculomegaly with associated findings)  Consider third trimester MRI and Pediatric Neurosurgery consultation for severe ventriculomegaly or in cases with associated structural or genetic abnormalities.   Delivery timing per routine obstetric indications   Mode of delivery per routine OB indications (consider  delivery if HC ?40 cm or BPD ?10.5 cm).        FETAL CHECKLIST  Primary MFM: Judit Mcnally  Primary OB: Dr. Velázquez  Genetic testing: Patient pursuing cell free DNA screen     I spent about 45  minutes of total time spent on the encounter, which includes face to face time and non-face to face time preparing to see the patient (eg, review of tests), obtaining and/or reviewing separately obtained history, documenting clinical information in the electronic or other health record, independently interpreting results (not separately  reported) and communicating results to the patient/family/caregiver, or care coordination (not separately reported).    Post [facility] consultation/ultrasound I notified Primary OB Dr. Velázquez on 12-28-22 of ultrasound findings and recommendations       Judit Mcnally  Maternal-Fetal Medicine    Electronically Signed by Judit Mcnally December 28, 2022        Pregnancy complicated by fetal cerebral ventriculomegaly  Patient accompanied her mother     I  discussed today's ultrasound in detail with the patient. In particular bilateral mild fetal ventriculomegaly.     Ventriculomegaly is defined as a dilation of the fetal lateral cerebral ventricles ?10 mm at the atrial level, at any stage of pregnancy. It is the most common fetal brain abnormality, noted in about 1% of all pregnancies. Ventriculomegaly is categorized by the degree of dilation: mild (10-12 mm), moderate (13-15 mm), and severe (>15mm).    Ventriculomegaly can be associated with genetic abnormalities (most common being Trisomy 21), infectious causes, and conditions associated with increased risk for intracranial hemorrhage.    The degree of dilation, the progression of dilation, and the presence or absence of associated anomalies are the best fetal prognostic indicators. Ultrasound features that confer a higher risk for a pathologic condition include progressive dilation of the ventricles and asymmetric dilation of the ventricles.      Mild ventriculomegaly, which may be a normal variant, is associated with overall reassuring outcomes. Survival rate is near 98% and there is a greater than 90% likelihood of normal development. Moderate ventriculomegaly is associated with survival rates between 80-97%, when isolated, and 75-93% likelihood of normal development. Severe ventriculomegaly has a survival rate of 33% and a 6-8% likelihood of normal development.      Diagnostic testing via amniocentesis should be offered to all patients with ventriculomegaly and this  was discussed today. The procedure-related pregnancy loss risk with amniocentesis was discussed with the patient.  Screening methods such as cell free DNA and serology for CMV, toxoplasmosis were discussed and offered to the patient. (She has a cat but her  cleans the cat liter box).     No family history reported as positive for hydrocephalus in males, L1CAM mutation testing should be offered.     Patient and her mother's questions were answered in detail. Patient declined amniocentesis. She desires pursuit of cell free DNA screen, CMV/Toxo IgM and IgG. Patient will be scheduled for a f/u ultrasound and RT MFM MD visit.     Recommendations:  Serial ultrasounds every 4 weeks (if isolated and mild, reasonable to repeat at 4 weeks and then once in early third trimester)   Genetic counseling offered - patient pursuing cell free DNA screen   Fetal echocardiogram and Pediatric Cardiology consultation at 22-26 weeks (only for isolated severe ventriculomegaly or any ventriculomegaly with associated findings).  Weekly  testing starting at 32 weeks (only for patients with isolated severe ventriculomegaly or any ventriculomegaly with associated findings)  Consider third trimester MRI and Pediatric Neurosurgery consultation for severe ventriculomegaly or in cases with associated structural or genetic abnormalities.   Delivery timing per routine obstetric indications   Mode of delivery per routine OB indications (consider  delivery if HC ?40 cm or BPD ?10.5 cm).

## 2022-12-23 LAB — CMV IGG SERPL QL IA: REACTIVE

## 2022-12-28 PROBLEM — O35.09X0 PREGNANCY COMPLICATED BY FETAL CEREBRAL VENTRICULOMEGALY: Status: ACTIVE | Noted: 2022-12-28

## 2022-12-28 LAB
T GONDII IGG SER QL IA: NORMAL
T GONDII IGG SERPL IA-ACNC: <5 IU/ML (ref 0–6.4)

## 2022-12-28 NOTE — ASSESSMENT & PLAN NOTE
Patient accompanied her mother     I  discussed today's ultrasound in detail with the patient. In particular bilateral mild fetal ventriculomegaly.     Ventriculomegaly is defined as a dilation of the fetal lateral cerebral ventricles ?10 mm at the atrial level, at any stage of pregnancy. It is the most common fetal brain abnormality, noted in about 1% of all pregnancies. Ventriculomegaly is categorized by the degree of dilation: mild (10-12 mm), moderate (13-15 mm), and severe (>15mm).    Ventriculomegaly can be associated with genetic abnormalities (most common being Trisomy 21), infectious causes, and conditions associated with increased risk for intracranial hemorrhage.    The degree of dilation, the progression of dilation, and the presence or absence of associated anomalies are the best fetal prognostic indicators. Ultrasound features that confer a higher risk for a pathologic condition include progressive dilation of the ventricles and asymmetric dilation of the ventricles.      Mild ventriculomegaly, which may be a normal variant, is associated with overall reassuring outcomes. Survival rate is near 98% and there is a greater than 90% likelihood of normal development. Moderate ventriculomegaly is associated with survival rates between 80-97%, when isolated, and 75-93% likelihood of normal development. Severe ventriculomegaly has a survival rate of 33% and a 6-8% likelihood of normal development.      Diagnostic testing via amniocentesis should be offered to all patients with ventriculomegaly and this was discussed today. The procedure-related pregnancy loss risk with amniocentesis was discussed with the patient.  Screening methods such as cell free DNA and serology for CMV, toxoplasmosis were discussed and offered to the patient. (She has a cat but her  cleans the cat liter box).     No family history reported as positive for hydrocephalus in males, L1CAM mutation testing should be offered.      Patient and her mother's questions were answered in detail. Patient declined amniocentesis. She desires pursuit of cell free DNA screen, CMV/Toxo IgM and IgG. Patient will be scheduled for a f/u ultrasound and RT RONY MD visit.     Recommendations:   Serial ultrasounds every 4 weeks (if isolated and mild, reasonable to repeat at 4 weeks and then once in early third trimester)    Genetic counseling offered - patient pursuing cell free DNA screen    Fetal echocardiogram and Pediatric Cardiology consultation at 22-26 weeks (only for isolated severe ventriculomegaly or any ventriculomegaly with associated findings).   Weekly  testing starting at 32 weeks (only for patients with isolated severe ventriculomegaly or any ventriculomegaly with associated findings)   Consider third trimester MRI and Pediatric Neurosurgery consultation for severe ventriculomegaly or in cases with associated structural or genetic abnormalities.    Delivery timing per routine obstetric indications   Mode of delivery per routine OB indications (consider  delivery if HC ?40 cm or BPD ?10.5 cm).

## 2022-12-29 LAB
CMV IGM SERPL IA-ACNC: <8 AU/ML
T GONDII IGM SER-ACNC: <3 AU/ML

## 2023-01-10 ENCOUNTER — PATIENT MESSAGE (OUTPATIENT)
Dept: MATERNAL FETAL MEDICINE | Facility: CLINIC | Age: 23
End: 2023-01-10
Payer: COMMERCIAL

## 2023-01-10 ENCOUNTER — PROCEDURE VISIT (OUTPATIENT)
Dept: OBSTETRICS AND GYNECOLOGY | Facility: CLINIC | Age: 23
End: 2023-01-10
Payer: COMMERCIAL

## 2023-01-10 ENCOUNTER — ROUTINE PRENATAL (OUTPATIENT)
Dept: OBSTETRICS AND GYNECOLOGY | Facility: CLINIC | Age: 23
End: 2023-01-10
Payer: COMMERCIAL

## 2023-01-10 ENCOUNTER — PATIENT MESSAGE (OUTPATIENT)
Dept: OTHER | Facility: OTHER | Age: 23
End: 2023-01-10
Payer: COMMERCIAL

## 2023-01-10 VITALS — SYSTOLIC BLOOD PRESSURE: 123 MMHG | DIASTOLIC BLOOD PRESSURE: 71 MMHG | WEIGHT: 175.06 LBS

## 2023-01-10 DIAGNOSIS — Z34.02 ENCOUNTER FOR SUPERVISION OF NORMAL FIRST PREGNANCY IN SECOND TRIMESTER: Primary | ICD-10-CM

## 2023-01-10 DIAGNOSIS — Z34.90 SUPERVISION OF LOW-RISK PREGNANCY, UNSPECIFIED TRIMESTER: ICD-10-CM

## 2023-01-10 DIAGNOSIS — O35.09X0 PREGNANCY COMPLICATED BY FETAL CEREBRAL VENTRICULOMEGALY, SINGLE OR UNSPECIFIED FETUS: ICD-10-CM

## 2023-01-10 PROBLEM — Z34.00 SUPERVISION OF NORMAL FIRST PREGNANCY, ANTEPARTUM: Status: ACTIVE | Noted: 2022-08-22

## 2023-01-10 PROBLEM — E28.2 PCOS (POLYCYSTIC OVARIAN SYNDROME): Status: ACTIVE | Noted: 2021-10-01

## 2023-01-10 LAB
BASOPHILS # BLD AUTO: 0.02 K/UL (ref 0–0.2)
BASOPHILS NFR BLD: 0.3 % (ref 0–1.9)
DIFFERENTIAL METHOD: ABNORMAL
EOSINOPHIL # BLD AUTO: 0.1 K/UL (ref 0–0.5)
EOSINOPHIL NFR BLD: 0.6 % (ref 0–8)
ERYTHROCYTE [DISTWIDTH] IN BLOOD BY AUTOMATED COUNT: 13 % (ref 11.5–14.5)
GLUCOSE SERPL-MCNC: 96 MG/DL (ref 70–140)
HCT VFR BLD AUTO: 34.2 % (ref 37–48.5)
HGB BLD-MCNC: 11.4 G/DL (ref 12–16)
IMM GRANULOCYTES # BLD AUTO: 0.03 K/UL (ref 0–0.04)
IMM GRANULOCYTES NFR BLD AUTO: 0.4 % (ref 0–0.5)
LYMPHOCYTES # BLD AUTO: 1.4 K/UL (ref 1–4.8)
LYMPHOCYTES NFR BLD: 18.4 % (ref 18–48)
MCH RBC QN AUTO: 31.5 PG (ref 27–31)
MCHC RBC AUTO-ENTMCNC: 33.3 G/DL (ref 32–36)
MCV RBC AUTO: 95 FL (ref 82–98)
MONOCYTES # BLD AUTO: 0.4 K/UL (ref 0.3–1)
MONOCYTES NFR BLD: 5 % (ref 4–15)
NEUTROPHILS # BLD AUTO: 5.9 K/UL (ref 1.8–7.7)
NEUTROPHILS NFR BLD: 75.3 % (ref 38–73)
NRBC BLD-RTO: 0 /100 WBC
PLATELET # BLD AUTO: 214 K/UL (ref 150–450)
PMV BLD AUTO: 10.2 FL (ref 9.2–12.9)
RBC # BLD AUTO: 3.62 M/UL (ref 4–5.4)
WBC # BLD AUTO: 7.79 K/UL (ref 3.9–12.7)

## 2023-01-10 PROCEDURE — 0502F SUBSEQUENT PRENATAL CARE: CPT | Mod: CPTII,S$GLB,, | Performed by: OBSTETRICS & GYNECOLOGY

## 2023-01-10 PROCEDURE — 85025 COMPLETE CBC W/AUTO DIFF WBC: CPT | Performed by: OBSTETRICS & GYNECOLOGY

## 2023-01-10 PROCEDURE — 99999 PR PBB SHADOW E&M-EST. PATIENT-LVL III: CPT | Mod: PBBFAC,,, | Performed by: OBSTETRICS & GYNECOLOGY

## 2023-01-10 PROCEDURE — 99999 PR PBB SHADOW E&M-EST. PATIENT-LVL III: ICD-10-PCS | Mod: PBBFAC,,, | Performed by: OBSTETRICS & GYNECOLOGY

## 2023-01-10 PROCEDURE — 82950 GLUCOSE TEST: CPT | Performed by: OBSTETRICS & GYNECOLOGY

## 2023-01-10 PROCEDURE — 0502F PR SUBSEQUENT PRENATAL CARE: ICD-10-PCS | Mod: CPTII,S$GLB,, | Performed by: OBSTETRICS & GYNECOLOGY

## 2023-01-10 NOTE — PROGRESS NOTES
Lab Documentation:    Order Type: Written Order placed in [facility]    Patient in for lab visit only per provider treatment plan.

## 2023-01-10 NOTE — PROGRESS NOTES
Pregnancy dating, labs, ultrasound reports, prenatal testing, and problem list; prior records and results; and available outside records were reviewed and updated in EMR.  Pertinent findings were noted below.    Reason for Visit   Routine Prenatal Visit    HPI   22 y.o., at 26w0d by Estimated Date of Delivery: 23    Reports feeling well with no concerns today    Contractions: No   Bleeding: No   Loss of fluid: No   Fetal movement: Yes   Nausea: No   Vomiting: No   Headache: No     Exam   /71   Wt 79.4 kg (175 lb 0.7 oz)   LMP 2022     TW lbs  GENERAL: No acute distress  ABD: Gravid    Assessment and Plan   Encounter for supervision of normal first pregnancy in second trimester    Pregnancy complicated by fetal cerebral ventriculomegaly, single or unspecified fetus         Glucola, CBC today   Tdap at next appointment    Scheduled for repeat MFM/US tomorrow      labor, Pain and bleeding and preE precautions given  Follow-up: 2 weeks    Jessica Bell MD  OBGYN, PGY-2    I have reviewed the Resident's progress note.  I have personally interviewed and examined the patient at bedside and agree with the findings as documented.  All patient questions answered.  -- KALIA Schaeffer M.D.

## 2023-01-11 ENCOUNTER — PROCEDURE VISIT (OUTPATIENT)
Dept: MATERNAL FETAL MEDICINE | Facility: CLINIC | Age: 23
End: 2023-01-11
Payer: COMMERCIAL

## 2023-01-11 ENCOUNTER — OFFICE VISIT (OUTPATIENT)
Dept: MATERNAL FETAL MEDICINE | Facility: CLINIC | Age: 23
End: 2023-01-11
Payer: COMMERCIAL

## 2023-01-11 VITALS
HEIGHT: 64 IN | BODY MASS INDEX: 29.13 KG/M2 | SYSTOLIC BLOOD PRESSURE: 118 MMHG | DIASTOLIC BLOOD PRESSURE: 70 MMHG | WEIGHT: 170.63 LBS

## 2023-01-11 DIAGNOSIS — Z36.89 ENCOUNTER FOR ULTRASOUND TO ASSESS FETAL GROWTH: ICD-10-CM

## 2023-01-11 DIAGNOSIS — O35.9XX0 KNOWN FETAL ANOMALY, ANTEPARTUM, SINGLE OR UNSPECIFIED FETUS: ICD-10-CM

## 2023-01-11 DIAGNOSIS — Z36.89 ENCOUNTER FOR ULTRASOUND TO CHECK FETAL GROWTH: ICD-10-CM

## 2023-01-11 DIAGNOSIS — O35.09X0 PREGNANCY COMPLICATED BY FETAL CEREBRAL VENTRICULOMEGALY, SINGLE OR UNSPECIFIED FETUS: Primary | ICD-10-CM

## 2023-01-11 DIAGNOSIS — O28.3 ECHOGENIC INTRACARDIAC FOCUS OF FETUS ON PRENATAL ULTRASOUND: ICD-10-CM

## 2023-01-11 PROCEDURE — 3078F PR MOST RECENT DIASTOLIC BLOOD PRESSURE < 80 MM HG: ICD-10-PCS | Mod: CPTII,S$GLB,, | Performed by: OBSTETRICS & GYNECOLOGY

## 2023-01-11 PROCEDURE — 99999 PR PBB SHADOW E&M-EST. PATIENT-LVL II: CPT | Mod: PBBFAC,,, | Performed by: OBSTETRICS & GYNECOLOGY

## 2023-01-11 PROCEDURE — 76816 US MFM PROCEDURE (VIEWPOINT): ICD-10-PCS | Mod: S$GLB,,, | Performed by: OBSTETRICS & GYNECOLOGY

## 2023-01-11 PROCEDURE — 3074F PR MOST RECENT SYSTOLIC BLOOD PRESSURE < 130 MM HG: ICD-10-PCS | Mod: CPTII,S$GLB,, | Performed by: OBSTETRICS & GYNECOLOGY

## 2023-01-11 PROCEDURE — 99213 PR OFFICE/OUTPT VISIT, EST, LEVL III, 20-29 MIN: ICD-10-PCS | Mod: 25,S$GLB,, | Performed by: OBSTETRICS & GYNECOLOGY

## 2023-01-11 PROCEDURE — 3078F DIAST BP <80 MM HG: CPT | Mod: CPTII,S$GLB,, | Performed by: OBSTETRICS & GYNECOLOGY

## 2023-01-11 PROCEDURE — 99213 OFFICE O/P EST LOW 20 MIN: CPT | Mod: 25,S$GLB,, | Performed by: OBSTETRICS & GYNECOLOGY

## 2023-01-11 PROCEDURE — 3008F PR BODY MASS INDEX (BMI) DOCUMENTED: ICD-10-PCS | Mod: CPTII,S$GLB,, | Performed by: OBSTETRICS & GYNECOLOGY

## 2023-01-11 PROCEDURE — 76816 OB US FOLLOW-UP PER FETUS: CPT | Mod: S$GLB,,, | Performed by: OBSTETRICS & GYNECOLOGY

## 2023-01-11 PROCEDURE — 1159F MED LIST DOCD IN RCRD: CPT | Mod: CPTII,S$GLB,, | Performed by: OBSTETRICS & GYNECOLOGY

## 2023-01-11 PROCEDURE — 3074F SYST BP LT 130 MM HG: CPT | Mod: CPTII,S$GLB,, | Performed by: OBSTETRICS & GYNECOLOGY

## 2023-01-11 PROCEDURE — 3008F BODY MASS INDEX DOCD: CPT | Mod: CPTII,S$GLB,, | Performed by: OBSTETRICS & GYNECOLOGY

## 2023-01-11 PROCEDURE — 1159F PR MEDICATION LIST DOCUMENTED IN MEDICAL RECORD: ICD-10-PCS | Mod: CPTII,S$GLB,, | Performed by: OBSTETRICS & GYNECOLOGY

## 2023-01-11 PROCEDURE — 99999 PR PBB SHADOW E&M-EST. PATIENT-LVL II: ICD-10-PCS | Mod: PBBFAC,,, | Performed by: OBSTETRICS & GYNECOLOGY

## 2023-01-12 NOTE — PROGRESS NOTES
"Maternal Fetal Medicine follow up consult    SUBJECTIVE:     Yenifer Quezada is a 22 y.o.  female with IUP at 26w1d who is seen in follow up consultation by MFM.  Pregnancy complications include:   Problem   Pregnancy Complicated By Fetal Cerebral Ventriculomegaly   Echogenic Intracardiac Focus of Fetus On Prenatal Ultrasound       Previous notes reviewed.   No changes to medical, surgical, family, social, or obstetric history.    Interval history since last MFM visit: No complaints.    Medications:  Current Outpatient Medications   Medication Instructions    ondansetron (ZOFRAN) 4 mg, Oral, Daily PRN    prenatal 105-iron-folic ac-dha 30 mg iron- 1.4 mg-300 mg Cmpk Oral       Care team members:  Dr. Velázquez - Primary OB     OBJECTIVE:   /70 (BP Location: Left arm, Patient Position: Sitting)   Ht 5' 3.5" (1.613 m)   Wt 77.4 kg (170 lb 10.2 oz)   LMP 2022   BMI 29.75 kg/m²     Physical Exam    Ultrasound performed. See viewpoint for full ultrasound report.    Significant labs/imaging:  cfDNA low risk.  CMV IgG pos/CMV IgM    ASSESSMENT/PLAN:     22 y.o.  female with IUP at 26w1d    Echogenic intracardiac focus of fetus on prenatal ultrasound  Neg cfDNA.    Pregnancy complicated by fetal cerebral ventriculomegaly  Dr. Mcnally's consult note reviewed.  Right lateral ventricle measures normal.  Left lateral ventricle measures 10 mm.  Prior work-up included: Neg cfDNA, CMV IgG pos/IgM neg. Toxo IgG/IgM neg.  Discussed CMV results; no recent infection but cannot rule out periconception infection.  Discussed follow-up recommendations for repeat US/MFM consult at 32 weeks.    F/u in 6 weeks for MFM visit/US  "

## 2023-01-12 NOTE — ASSESSMENT & PLAN NOTE
Dr. Mcnally's consult note reviewed.  Right lateral ventricle measures normal.  Left lateral ventricle measures 10 mm.  Prior work-up included: Neg cfDNA, CMV IgG pos/IgM neg. Toxo IgG/IgM neg.  Discussed CMV results; no recent infection but cannot rule out periconception infection.  Discussed follow-up recommendations for repeat US/MFM consult at 32 weeks.

## 2023-01-24 ENCOUNTER — ROUTINE PRENATAL (OUTPATIENT)
Dept: OBSTETRICS AND GYNECOLOGY | Facility: CLINIC | Age: 23
End: 2023-01-24
Payer: COMMERCIAL

## 2023-01-24 ENCOUNTER — PATIENT MESSAGE (OUTPATIENT)
Dept: OTHER | Facility: OTHER | Age: 23
End: 2023-01-24
Payer: COMMERCIAL

## 2023-01-24 VITALS
SYSTOLIC BLOOD PRESSURE: 116 MMHG | WEIGHT: 176.38 LBS | BODY MASS INDEX: 30.75 KG/M2 | DIASTOLIC BLOOD PRESSURE: 70 MMHG

## 2023-01-24 DIAGNOSIS — Z23 NEED FOR DIPHTHERIA-TETANUS-PERTUSSIS (TDAP) VACCINE: ICD-10-CM

## 2023-01-24 DIAGNOSIS — Z34.90 ENCOUNTER FOR SUPERVISION OF LOW-RISK PREGNANCY, ANTEPARTUM: ICD-10-CM

## 2023-01-24 DIAGNOSIS — O35.09X0 PREGNANCY COMPLICATED BY FETAL CEREBRAL VENTRICULOMEGALY, SINGLE OR UNSPECIFIED FETUS: Primary | ICD-10-CM

## 2023-01-24 DIAGNOSIS — O28.3 ECHOGENIC INTRACARDIAC FOCUS OF FETUS ON PRENATAL ULTRASOUND: ICD-10-CM

## 2023-01-24 PROCEDURE — 90715 TDAP VACCINE GREATER THAN OR EQUAL TO 7YO IM: ICD-10-PCS | Mod: S$GLB,,, | Performed by: OBSTETRICS & GYNECOLOGY

## 2023-01-24 PROCEDURE — 90471 IMMUNIZATION ADMIN: CPT | Mod: S$GLB,,, | Performed by: OBSTETRICS & GYNECOLOGY

## 2023-01-24 PROCEDURE — 99999 PR PBB SHADOW E&M-EST. PATIENT-LVL II: ICD-10-PCS | Mod: PBBFAC,,, | Performed by: OBSTETRICS & GYNECOLOGY

## 2023-01-24 PROCEDURE — 99999 PR PBB SHADOW E&M-EST. PATIENT-LVL II: CPT | Mod: PBBFAC,,, | Performed by: OBSTETRICS & GYNECOLOGY

## 2023-01-24 PROCEDURE — 0502F PR SUBSEQUENT PRENATAL CARE: ICD-10-PCS | Mod: CPTII,S$GLB,, | Performed by: OBSTETRICS & GYNECOLOGY

## 2023-01-24 PROCEDURE — 0502F SUBSEQUENT PRENATAL CARE: CPT | Mod: CPTII,S$GLB,, | Performed by: OBSTETRICS & GYNECOLOGY

## 2023-01-24 PROCEDURE — 90715 TDAP VACCINE 7 YRS/> IM: CPT | Mod: S$GLB,,, | Performed by: OBSTETRICS & GYNECOLOGY

## 2023-01-24 PROCEDURE — 90471 TDAP VACCINE GREATER THAN OR EQUAL TO 7YO IM: ICD-10-PCS | Mod: S$GLB,,, | Performed by: OBSTETRICS & GYNECOLOGY

## 2023-01-24 NOTE — PROGRESS NOTES
Pregnancy dating, labs, ultrasound reports, prenatal testing, and problem list; prior records and results; and available outside records were reviewed and updated in EMR.  Pertinent findings were noted below.    Reason for Visit   Routine Prenatal Visit    HPI   22 y.o., at 28w0d by Estimated Date of Delivery: 23    Denies complaints    Contractions: No   Bleeding: No   Loss of fluid: No   Fetal movement: Yes   Nausea: No   Vomiting: No   Headache: No     Exam   /70   Wt 80 kg (176 lb 5.9 oz)   LMP 2022   BMI 30.75 kg/m²     TW#  GENERAL: No acute distress  ABD: Gravid    Assessment and Plan   Pregnancy complicated by fetal cerebral ventriculomegaly, single or unspecified fetus    Echogenic intracardiac focus of fetus on prenatal ultrasound    Encounter for supervision of low-risk pregnancy, antepartum         Discussed 33# weight gain. Recommended walking 30 minutes 5x/week   Birth control options discussed and handout provided. Patient considering options. Readdress at subsequent visits.   Tdap vaccine today   Repeat US and consult with EVIEM scheduled      labor and Pain and bleeding precautions given  Follow-up: 2 weeks

## 2023-01-30 ENCOUNTER — TELEPHONE (OUTPATIENT)
Dept: OBSTETRICS AND GYNECOLOGY | Facility: CLINIC | Age: 23
End: 2023-01-30
Payer: COMMERCIAL

## 2023-01-30 NOTE — TELEPHONE ENCOUNTER
----- Message from Pavel Courtney MA sent at 1/30/2023  9:23 AM CST -----  Contact: NATALY KELLER [[id number]]    ----- Message -----  From: Juliana Higgins  Sent: 1/30/2023   9:16 AM CST  To: , #    Type: Call Back      Who called: NATALY KELLER [[id number]]      What is the request in detail: Patient is requesting a call back in regard to rescheduling her 02/22 appointment.   Please advise.     Can the clinic reply by [facility]? Yes      Would the patient rather a call back or a response via [facility]? Call back       Best call back number: [phone] (home)       Additional Information:

## 2023-02-07 ENCOUNTER — ROUTINE PRENATAL (OUTPATIENT)
Dept: OBSTETRICS AND GYNECOLOGY | Facility: CLINIC | Age: 23
End: 2023-02-07
Payer: COMMERCIAL

## 2023-02-07 ENCOUNTER — PATIENT MESSAGE (OUTPATIENT)
Dept: OTHER | Facility: OTHER | Age: 23
End: 2023-02-07
Payer: COMMERCIAL

## 2023-02-07 VITALS
SYSTOLIC BLOOD PRESSURE: 110 MMHG | WEIGHT: 176.81 LBS | DIASTOLIC BLOOD PRESSURE: 62 MMHG | BODY MASS INDEX: 30.83 KG/M2

## 2023-02-07 DIAGNOSIS — Z34.90 SUPERVISION OF LOW-RISK PREGNANCY, UNSPECIFIED TRIMESTER: Primary | ICD-10-CM

## 2023-02-07 PROCEDURE — 0502F PR SUBSEQUENT PRENATAL CARE: ICD-10-PCS | Mod: CPTII,S$GLB,, | Performed by: OBSTETRICS & GYNECOLOGY

## 2023-02-07 PROCEDURE — 0502F SUBSEQUENT PRENATAL CARE: CPT | Mod: CPTII,S$GLB,, | Performed by: OBSTETRICS & GYNECOLOGY

## 2023-02-07 PROCEDURE — 99999 PR PBB SHADOW E&M-EST. PATIENT-LVL II: CPT | Mod: PBBFAC,,, | Performed by: OBSTETRICS & GYNECOLOGY

## 2023-02-07 PROCEDURE — 99999 PR PBB SHADOW E&M-EST. PATIENT-LVL II: ICD-10-PCS | Mod: PBBFAC,,, | Performed by: OBSTETRICS & GYNECOLOGY

## 2023-02-07 NOTE — PROGRESS NOTES
Pregnancy dating, labs, ultrasound reports, prenatal testing, and problem list; prior records and results; and available outside records were reviewed and updated in EMR.  Pertinent findings were noted below.    Reason for Visit   No chief complaint on file.    HPI   22 y.o., at 30w0d by Estimated Date of Delivery: 23    Denies complaints today. Reports has been walking ~4x a week to avoid excessive maternal weight gain. Cannot walk everyday because sometimes she gets home late at night    Contractions: No   Bleeding: No   Loss of fluid: No   Fetal movement: Yes   Nausea: No   Vomiting: No   Headache: No     Exam   /62   Wt 80.2 kg (176 lb 12.9 oz)   LMP 2022   BMI 30.83 kg/m²     TW#  GENERAL: No acute distress  ABD: Gravid    Assessment and Plan   Supervision of low-risk pregnancy, unspecified trimester         Discussed 33# weight gain, continued walking encouraged. Pt reports compliance with this recommendation    Birth control options discussed and handout provided. Patient considering options. Readdress at subsequent visits.   S/p Tdap and flu vaccine   Repeat US and consult with RONY scheduled      labor precautions given  Follow-up: 2 weeks     Maia Brooks MD  OBGYN PGY-2

## 2023-02-20 ENCOUNTER — PATIENT MESSAGE (OUTPATIENT)
Dept: MATERNAL FETAL MEDICINE | Facility: CLINIC | Age: 23
End: 2023-02-20
Payer: COMMERCIAL

## 2023-02-21 ENCOUNTER — PATIENT MESSAGE (OUTPATIENT)
Dept: OTHER | Facility: OTHER | Age: 23
End: 2023-02-21
Payer: COMMERCIAL

## 2023-02-22 ENCOUNTER — OFFICE VISIT (OUTPATIENT)
Dept: MATERNAL FETAL MEDICINE | Facility: CLINIC | Age: 23
End: 2023-02-22
Payer: COMMERCIAL

## 2023-02-22 ENCOUNTER — PROCEDURE VISIT (OUTPATIENT)
Dept: MATERNAL FETAL MEDICINE | Facility: CLINIC | Age: 23
End: 2023-02-22
Payer: COMMERCIAL

## 2023-02-22 VITALS — DIASTOLIC BLOOD PRESSURE: 72 MMHG | WEIGHT: 183 LBS | SYSTOLIC BLOOD PRESSURE: 106 MMHG | BODY MASS INDEX: 31.91 KG/M2

## 2023-02-22 DIAGNOSIS — O35.09X0 PREGNANCY COMPLICATED BY FETAL CEREBRAL VENTRICULOMEGALY, SINGLE OR UNSPECIFIED FETUS: ICD-10-CM

## 2023-02-22 DIAGNOSIS — Z36.89 ENCOUNTER FOR ULTRASOUND TO ASSESS FETAL GROWTH: ICD-10-CM

## 2023-02-22 DIAGNOSIS — O35.09X0 PREGNANCY COMPLICATED BY FETAL CEREBRAL VENTRICULOMEGALY, SINGLE OR UNSPECIFIED FETUS: Primary | ICD-10-CM

## 2023-02-22 PROCEDURE — 3078F PR MOST RECENT DIASTOLIC BLOOD PRESSURE < 80 MM HG: ICD-10-PCS | Mod: CPTII,S$GLB,, | Performed by: OBSTETRICS & GYNECOLOGY

## 2023-02-22 PROCEDURE — 3074F SYST BP LT 130 MM HG: CPT | Mod: CPTII,S$GLB,, | Performed by: OBSTETRICS & GYNECOLOGY

## 2023-02-22 PROCEDURE — 3078F DIAST BP <80 MM HG: CPT | Mod: CPTII,S$GLB,, | Performed by: OBSTETRICS & GYNECOLOGY

## 2023-02-22 PROCEDURE — 99999 PR PBB SHADOW E&M-EST. PATIENT-LVL II: CPT | Mod: PBBFAC,,, | Performed by: OBSTETRICS & GYNECOLOGY

## 2023-02-22 PROCEDURE — 99999 PR PBB SHADOW E&M-EST. PATIENT-LVL II: ICD-10-PCS | Mod: PBBFAC,,, | Performed by: OBSTETRICS & GYNECOLOGY

## 2023-02-22 PROCEDURE — 3008F PR BODY MASS INDEX (BMI) DOCUMENTED: ICD-10-PCS | Mod: CPTII,S$GLB,, | Performed by: OBSTETRICS & GYNECOLOGY

## 2023-02-22 PROCEDURE — 76816 OB US FOLLOW-UP PER FETUS: CPT | Mod: S$GLB,,, | Performed by: OBSTETRICS & GYNECOLOGY

## 2023-02-22 PROCEDURE — 99213 PR OFFICE/OUTPT VISIT, EST, LEVL III, 20-29 MIN: ICD-10-PCS | Mod: 25,,, | Performed by: OBSTETRICS & GYNECOLOGY

## 2023-02-22 PROCEDURE — 76816 PR  US,PREGNANT UTERUS,F/U,TRANSABD APP: ICD-10-PCS | Mod: S$GLB,,, | Performed by: OBSTETRICS & GYNECOLOGY

## 2023-02-22 PROCEDURE — 99213 OFFICE O/P EST LOW 20 MIN: CPT | Mod: 25,,, | Performed by: OBSTETRICS & GYNECOLOGY

## 2023-02-22 PROCEDURE — 1159F PR MEDICATION LIST DOCUMENTED IN MEDICAL RECORD: ICD-10-PCS | Mod: CPTII,S$GLB,, | Performed by: OBSTETRICS & GYNECOLOGY

## 2023-02-22 PROCEDURE — 76816 OB US FOLLOW-UP PER FETUS: CPT | Mod: PBBFAC | Performed by: OBSTETRICS & GYNECOLOGY

## 2023-02-22 PROCEDURE — 3008F BODY MASS INDEX DOCD: CPT | Mod: CPTII,S$GLB,, | Performed by: OBSTETRICS & GYNECOLOGY

## 2023-02-22 PROCEDURE — 1159F MED LIST DOCD IN RCRD: CPT | Mod: CPTII,S$GLB,, | Performed by: OBSTETRICS & GYNECOLOGY

## 2023-02-22 PROCEDURE — 99212 OFFICE O/P EST SF 10 MIN: CPT | Mod: PBBFAC,TH | Performed by: OBSTETRICS & GYNECOLOGY

## 2023-02-22 PROCEDURE — 3074F PR MOST RECENT SYSTOLIC BLOOD PRESSURE < 130 MM HG: ICD-10-PCS | Mod: CPTII,S$GLB,, | Performed by: OBSTETRICS & GYNECOLOGY

## 2023-02-22 NOTE — PROGRESS NOTES
Consultation  ==========    25 minutes of total time was spent on the encounter which included face-to-face time and non-face-to-face time preparing to see the patient,  obtaining and or reviewing separately obtained history, documenting clinical information in the electronic or other health record, independently  interpreting results (not separately reported) and communicating results to the patient and her partner, or care coordination (not separately  reported).    Referring MD: Dr. Velázquez    Indication for consultation: mild/borderline cerebral lateral ventriculomegaly on prior studies    Prior studies:  1. low risk cfDNA  2. CMV serologies - IgG+ and IgM-  3. Toxoplasmosis serologies - IgG and IgM negative    On today's exam, the lateral cerebral ventricles are normal in size, and no other intracranial abnormalities are seen. Fetal size is AGA. No  other fetal abnormalities have been identified on prior exams.  Therefore, no further f/u with [facility] is planned. Should another indication for consultation or f/u scan arise, please refer the patient back for  evaluation.    Ultrasound Impression  =========    1. IUP - 32 and 1/7 weeks  2. AGA fetal size - EFW of 1867 gm; 25%  3. Normal size cerebral lateral ventricles    Recommendation  ==============    Repeat ultrasound study as clinically indicated per primary OB. No further ultrasounds have been scheduled by M.

## 2023-02-24 ENCOUNTER — ROUTINE PRENATAL (OUTPATIENT)
Dept: OBSTETRICS AND GYNECOLOGY | Facility: CLINIC | Age: 23
End: 2023-02-24
Payer: COMMERCIAL

## 2023-02-24 VITALS
SYSTOLIC BLOOD PRESSURE: 110 MMHG | DIASTOLIC BLOOD PRESSURE: 64 MMHG | WEIGHT: 180.75 LBS | BODY MASS INDEX: 31.52 KG/M2

## 2023-02-24 DIAGNOSIS — Z34.03 ENCOUNTER FOR SUPERVISION OF NORMAL FIRST PREGNANCY IN THIRD TRIMESTER: Primary | ICD-10-CM

## 2023-02-24 PROCEDURE — 0502F SUBSEQUENT PRENATAL CARE: CPT | Mod: CPTII,S$GLB,, | Performed by: ADVANCED PRACTICE MIDWIFE

## 2023-02-24 PROCEDURE — 99999 PR PBB SHADOW E&M-EST. PATIENT-LVL III: ICD-10-PCS | Mod: PBBFAC,,, | Performed by: ADVANCED PRACTICE MIDWIFE

## 2023-02-24 PROCEDURE — 0502F PR SUBSEQUENT PRENATAL CARE: ICD-10-PCS | Mod: CPTII,S$GLB,, | Performed by: ADVANCED PRACTICE MIDWIFE

## 2023-02-24 PROCEDURE — 99999 PR PBB SHADOW E&M-EST. PATIENT-LVL III: CPT | Mod: PBBFAC,,, | Performed by: ADVANCED PRACTICE MIDWIFE

## 2023-02-24 NOTE — PROGRESS NOTES
Reason for visit: Routine Prenatal Visit      HPI:   22 y.o., at 32w3d by Estimated Date of Delivery: 23    In with no c/o    - Contractions: denies  - Bleeding: denies  - Loss of fluid: denies  - Fetal movement: reports good FM, reinforced BId  - Nausea: no  - Vomiting: no  - Headache: no      Reviewed:    Past medical, surgical, social, family, and obstetric history: Reviewed and updated in EMR.  Medications: Reviewed and updated in EMR.  Allergies: Pcn [penicillins]    Pregnancy dating, labs, ultrasound reports, prenatal testing, and problem list: Reviewed and updated in EMR.  Outside records: na  Independent interpretation of tests: na  Discussion with another healthcare professional: na      Vitals: /64   Wt 82 kg (180 lb 12.4 oz)   LMP 2022   BMI 31.52 kg/m²     Physical exam:  GENERAL: No acute distress  ABD: Gravid      Assessment and Plan:    Encounter for supervision of normal first pregnancy in third trimester  -     BREAST PUMP FOR HOME USE         Discussed BCM p del and pt desires to use OCPs   Breast pump rx provided today   Discussed 4# weight gain- discussed decrease carbs [white foods] and increase salads, vegetables- pt reports did not get to walk a lot last wee- had sinusitis   Pt reports does not have birth plan     labor precautions given  Follow-up: 2 weeks      I spent a total of 20 minutes on the day of the visit. This includes face to face time and non-face to face time preparing to see the patient (eg, review of tests), Obtaining and/or reviewing separately obtained history, Documenting clinical information in the electronic or other health record, Independently interpreting results and communicating results to the patient/family/caregiver, or Care coordination.

## 2023-03-08 ENCOUNTER — ROUTINE PRENATAL (OUTPATIENT)
Dept: OBSTETRICS AND GYNECOLOGY | Facility: CLINIC | Age: 23
End: 2023-03-08
Payer: COMMERCIAL

## 2023-03-08 VITALS
BODY MASS INDEX: 32.41 KG/M2 | DIASTOLIC BLOOD PRESSURE: 62 MMHG | WEIGHT: 185.88 LBS | SYSTOLIC BLOOD PRESSURE: 108 MMHG

## 2023-03-08 DIAGNOSIS — Z34.90 ENCOUNTER FOR SUPERVISION OF LOW-RISK PREGNANCY, ANTEPARTUM: ICD-10-CM

## 2023-03-08 DIAGNOSIS — Z34.03 ENCOUNTER FOR SUPERVISION OF NORMAL FIRST PREGNANCY IN THIRD TRIMESTER: Primary | ICD-10-CM

## 2023-03-08 PROCEDURE — 0502F SUBSEQUENT PRENATAL CARE: CPT | Mod: CPTII,S$GLB,, | Performed by: OBSTETRICS & GYNECOLOGY

## 2023-03-08 PROCEDURE — 0502F PR SUBSEQUENT PRENATAL CARE: ICD-10-PCS | Mod: CPTII,S$GLB,, | Performed by: OBSTETRICS & GYNECOLOGY

## 2023-03-08 PROCEDURE — 99999 PR PBB SHADOW E&M-EST. PATIENT-LVL II: CPT | Mod: PBBFAC,,, | Performed by: OBSTETRICS & GYNECOLOGY

## 2023-03-08 PROCEDURE — 99999 PR PBB SHADOW E&M-EST. PATIENT-LVL II: ICD-10-PCS | Mod: PBBFAC,,, | Performed by: OBSTETRICS & GYNECOLOGY

## 2023-03-08 NOTE — PROGRESS NOTES
Pregnancy dating, labs, ultrasound reports, prenatal testing, and problem list; prior records and results; and available outside records were reviewed and updated in EMR.  Pertinent findings were noted below.    Reason for Visit   Routine Prenatal Visit    HPI   22 y.o., at 34w1d by Estimated Date of Delivery: 23    Denies complaints    Contractions: No   Bleeding: No   Loss of fluid: No   Fetal movement: Yes   Nausea: No   Vomiting: No   Headache: No     Exam   /62   Wt 84.3 kg (185 lb 13.6 oz)   LMP 2022   BMI 32.41 kg/m²     TWlbs  GENERAL: No acute distress  ABD: Gravid    Assessment and Plan   Encounter for supervision of normal first pregnancy in third trimester    Encounter for supervision of low-risk pregnancy, antepartum         S/p Tdap and Glucola   GBS and 3rd Trimester labs on RTC   Discussed 42lb weight gain, encouraged continued walking (30 minutes per day for at least 4 days of the week), healthy diet      labor precautions given  Follow-up: 2 weeks     Maia Brooks MD  OBGYN PGY-2

## 2023-03-14 ENCOUNTER — PATIENT MESSAGE (OUTPATIENT)
Dept: OTHER | Facility: OTHER | Age: 23
End: 2023-03-14
Payer: COMMERCIAL

## 2023-03-22 ENCOUNTER — PROCEDURE VISIT (OUTPATIENT)
Dept: OBSTETRICS AND GYNECOLOGY | Facility: CLINIC | Age: 23
End: 2023-03-22
Payer: COMMERCIAL

## 2023-03-22 ENCOUNTER — ROUTINE PRENATAL (OUTPATIENT)
Dept: OBSTETRICS AND GYNECOLOGY | Facility: CLINIC | Age: 23
End: 2023-03-22
Payer: COMMERCIAL

## 2023-03-22 VITALS
SYSTOLIC BLOOD PRESSURE: 124 MMHG | BODY MASS INDEX: 33.06 KG/M2 | DIASTOLIC BLOOD PRESSURE: 82 MMHG | WEIGHT: 189.63 LBS

## 2023-03-22 DIAGNOSIS — Z34.03 ENCOUNTER FOR SUPERVISION OF NORMAL FIRST PREGNANCY IN THIRD TRIMESTER: Primary | ICD-10-CM

## 2023-03-22 DIAGNOSIS — Z34.03 ENCOUNTER FOR SUPERVISION OF NORMAL FIRST PREGNANCY IN THIRD TRIMESTER: ICD-10-CM

## 2023-03-22 LAB
BASOPHILS # BLD AUTO: 0.02 K/UL (ref 0–0.2)
BASOPHILS NFR BLD: 0.2 % (ref 0–1.9)
DIFFERENTIAL METHOD: ABNORMAL
EOSINOPHIL # BLD AUTO: 0.1 K/UL (ref 0–0.5)
EOSINOPHIL NFR BLD: 0.7 % (ref 0–8)
ERYTHROCYTE [DISTWIDTH] IN BLOOD BY AUTOMATED COUNT: 13 % (ref 11.5–14.5)
HCT VFR BLD AUTO: 34.9 % (ref 37–48.5)
HGB BLD-MCNC: 11.6 G/DL (ref 12–16)
HIV 1+2 AB+HIV1 P24 AG SERPL QL IA: NORMAL
IMM GRANULOCYTES # BLD AUTO: 0.04 K/UL (ref 0–0.04)
IMM GRANULOCYTES NFR BLD AUTO: 0.5 % (ref 0–0.5)
LYMPHOCYTES # BLD AUTO: 1.5 K/UL (ref 1–4.8)
LYMPHOCYTES NFR BLD: 18.6 % (ref 18–48)
MCH RBC QN AUTO: 30.9 PG (ref 27–31)
MCHC RBC AUTO-ENTMCNC: 33.2 G/DL (ref 32–36)
MCV RBC AUTO: 93 FL (ref 82–98)
MONOCYTES # BLD AUTO: 0.6 K/UL (ref 0.3–1)
MONOCYTES NFR BLD: 6.8 % (ref 4–15)
NEUTROPHILS # BLD AUTO: 6 K/UL (ref 1.8–7.7)
NEUTROPHILS NFR BLD: 73.2 % (ref 38–73)
NRBC BLD-RTO: 0 /100 WBC
PLATELET # BLD AUTO: 213 K/UL (ref 150–450)
PMV BLD AUTO: 10.5 FL (ref 9.2–12.9)
RBC # BLD AUTO: 3.75 M/UL (ref 4–5.4)
RPR SER QL: NORMAL
WBC # BLD AUTO: 8.22 K/UL (ref 3.9–12.7)

## 2023-03-22 PROCEDURE — 99999 PR PBB SHADOW E&M-EST. PATIENT-LVL II: CPT | Mod: PBBFAC,,, | Performed by: OBSTETRICS & GYNECOLOGY

## 2023-03-22 PROCEDURE — 87147 CULTURE TYPE IMMUNOLOGIC: CPT | Performed by: OBSTETRICS & GYNECOLOGY

## 2023-03-22 PROCEDURE — 85025 COMPLETE CBC W/AUTO DIFF WBC: CPT | Performed by: OBSTETRICS & GYNECOLOGY

## 2023-03-22 PROCEDURE — 99999 PR PBB SHADOW E&M-EST. PATIENT-LVL II: ICD-10-PCS | Mod: PBBFAC,,, | Performed by: OBSTETRICS & GYNECOLOGY

## 2023-03-22 PROCEDURE — 0502F SUBSEQUENT PRENATAL CARE: CPT | Mod: CPTII,S$GLB,, | Performed by: OBSTETRICS & GYNECOLOGY

## 2023-03-22 PROCEDURE — 0502F PR SUBSEQUENT PRENATAL CARE: ICD-10-PCS | Mod: CPTII,S$GLB,, | Performed by: OBSTETRICS & GYNECOLOGY

## 2023-03-22 PROCEDURE — 87184 SC STD DISK METHOD PER PLATE: CPT | Performed by: OBSTETRICS & GYNECOLOGY

## 2023-03-22 PROCEDURE — 87389 HIV-1 AG W/HIV-1&-2 AB AG IA: CPT | Performed by: OBSTETRICS & GYNECOLOGY

## 2023-03-22 PROCEDURE — 87081 CULTURE SCREEN ONLY: CPT | Performed by: OBSTETRICS & GYNECOLOGY

## 2023-03-22 PROCEDURE — 86592 SYPHILIS TEST NON-TREP QUAL: CPT | Performed by: OBSTETRICS & GYNECOLOGY

## 2023-03-22 NOTE — PROGRESS NOTES
Pregnancy dating, labs, ultrasound reports, prenatal testing, and problem list; prior records and results; and available outside records were reviewed and updated in EMR.  Pertinent findings were noted below.    Reason for Visit: Routine Prenatal Visit    36w1d by Estimated Date of Delivery: 23    Blood pressure 124/82, weight 86 kg (189 lb 9.5 oz), last menstrual period 2022.  TWG 46#    SVE: closed, external os 1cm  V-scan: VTX    Encounter for supervision of normal first pregnancy in third trimester  -     Strep B Screen, Vaginal / Rectal  -     HIV 1/2 Ag/Ab (4th Gen); Future; Expected date: 2023  -     RPR; Future; Expected date: 2023  -     CBC Auto Differential; Future; Expected date: 2023  -     IP OB Labor Induction; Future; Expected date: 2023      Occasional cramps or contractions, not consistent.  No bleeding or loss of fluid.  +fetal movement.  Labs reviewed.  GBS and 3rd trimester labs today.  C/o LLE (ankles both 25cm). VTE/LENNY precautions given.  Desires IOL in 39th week, considering outpatient cervical ripening balloon, information provided. Patient will let us know at next visit. IOL requested for  @ 5pm.  List of pediatricians given  MOF/MOC: Pumping/pills  Consents signed today     labor, Pain and bleeding, preE, and fetal movement count precautions given  Follow-up: 1 week

## 2023-03-22 NOTE — PROGRESS NOTES
Lab Documentation:    Order Type: Written Order placed in [facility]    Patient in for lab visit only per provider treatment plan.

## 2023-03-27 PROBLEM — O99.820 GBS (GROUP B STREPTOCOCCUS CARRIER), +RV CULTURE, CURRENTLY PREGNANT: Status: ACTIVE | Noted: 2023-03-27

## 2023-03-27 LAB — BACTERIA SPEC AEROBE CULT: ABNORMAL

## 2023-03-29 ENCOUNTER — PATIENT MESSAGE (OUTPATIENT)
Dept: OBSTETRICS AND GYNECOLOGY | Facility: CLINIC | Age: 23
End: 2023-03-29

## 2023-03-29 ENCOUNTER — ROUTINE PRENATAL (OUTPATIENT)
Dept: OBSTETRICS AND GYNECOLOGY | Facility: CLINIC | Age: 23
End: 2023-03-29
Payer: COMMERCIAL

## 2023-03-29 VITALS
SYSTOLIC BLOOD PRESSURE: 120 MMHG | BODY MASS INDEX: 33.87 KG/M2 | DIASTOLIC BLOOD PRESSURE: 70 MMHG | WEIGHT: 194.25 LBS

## 2023-03-29 DIAGNOSIS — O99.820 GROUP B STREPTOCOCCUS CARRIER, +RV CULTURE, CURRENTLY PREGNANT: ICD-10-CM

## 2023-03-29 DIAGNOSIS — Z34.03 ENCOUNTER FOR SUPERVISION OF NORMAL FIRST PREGNANCY IN THIRD TRIMESTER: Primary | ICD-10-CM

## 2023-03-29 DIAGNOSIS — M79.89 LEFT LEG SWELLING: ICD-10-CM

## 2023-03-29 PROCEDURE — 99999 PR PBB SHADOW E&M-EST. PATIENT-LVL III: CPT | Mod: PBBFAC,,, | Performed by: OBSTETRICS & GYNECOLOGY

## 2023-03-29 PROCEDURE — 99999 PR PBB SHADOW E&M-EST. PATIENT-LVL III: ICD-10-PCS | Mod: PBBFAC,,, | Performed by: OBSTETRICS & GYNECOLOGY

## 2023-03-29 PROCEDURE — 0502F PR SUBSEQUENT PRENATAL CARE: ICD-10-PCS | Mod: CPTII,S$GLB,, | Performed by: OBSTETRICS & GYNECOLOGY

## 2023-03-29 PROCEDURE — 0502F SUBSEQUENT PRENATAL CARE: CPT | Mod: CPTII,S$GLB,, | Performed by: OBSTETRICS & GYNECOLOGY

## 2023-03-29 NOTE — PROGRESS NOTES
Pregnancy dating, labs, ultrasound reports, prenatal testing, and problem list; prior records and results; and available outside records were reviewed and updated in EMR.  Pertinent findings were noted below.    Reason for Visit: Routine Prenatal Visit    37w1d by Estimated Date of Delivery: 4/18/23    Blood pressure 120/70, weight 88.1 kg (194 lb 3.6 oz), last menstrual period 05/12/2022.  TWG 50#  R ankle 24cm  L ankle 26cm    Encounter for supervision of normal first pregnancy in third trimester    Left leg swelling  -     US Lower Extremity Veins Left; Future; Expected date: 03/29/2023    Group B Streptococcus carrier, +RV culture, currently pregnant       No contractions, bleeding, or loss of fluid.  +fetal movement.  Labs reviewed and up to date.   GBS+, PCN allergy, Clinda sensitive   LLE>RLE - rule out VTE with lower leg US   Scheduled for IOL 4/12, patient to get cervical ripening balloon on 4/12 AM, PNT after    Labor, Pain and bleeding, preE and fetal movement count precautions given  Follow-up: 1 week

## 2023-03-31 ENCOUNTER — PATIENT MESSAGE (OUTPATIENT)
Dept: OBSTETRICS AND GYNECOLOGY | Facility: CLINIC | Age: 23
End: 2023-03-31
Payer: COMMERCIAL

## 2023-04-04 ENCOUNTER — HOSPITAL ENCOUNTER (OUTPATIENT)
Dept: RADIOLOGY | Facility: OTHER | Age: 23
Discharge: HOME | End: 2023-04-04
Attending: OBSTETRICS & GYNECOLOGY
Payer: COMMERCIAL

## 2023-04-04 DIAGNOSIS — M79.89 LEFT LEG SWELLING: ICD-10-CM

## 2023-04-04 PROCEDURE — 93971 EXTREMITY STUDY: CPT | Mod: TC,LT

## 2023-04-04 PROCEDURE — 93971 US LOWER EXTREMITY VEINS LEFT: ICD-10-PCS | Mod: 26,LT,, | Performed by: RADIOLOGY

## 2023-04-04 PROCEDURE — 93971 EXTREMITY STUDY: CPT | Mod: 26,LT,, | Performed by: RADIOLOGY

## 2023-04-05 ENCOUNTER — ROUTINE PRENATAL (OUTPATIENT)
Dept: OBSTETRICS AND GYNECOLOGY | Facility: CLINIC | Age: 23
End: 2023-04-05
Payer: COMMERCIAL

## 2023-04-05 VITALS — DIASTOLIC BLOOD PRESSURE: 80 MMHG | SYSTOLIC BLOOD PRESSURE: 122 MMHG | BODY MASS INDEX: 33.83 KG/M2 | WEIGHT: 194 LBS

## 2023-04-05 DIAGNOSIS — O26.00 EXCESSIVE WEIGHT GAIN DURING PREGNANCY, ANTEPARTUM: ICD-10-CM

## 2023-04-05 DIAGNOSIS — O47.1 FALSE LABOR AT OR AFTER 37 COMPLETED WEEKS OF GESTATION: Primary | ICD-10-CM

## 2023-04-05 PROCEDURE — 99999 PR PBB SHADOW E&M-EST. PATIENT-LVL II: CPT | Mod: PBBFAC,,, | Performed by: OBSTETRICS & GYNECOLOGY

## 2023-04-05 PROCEDURE — 99999 PR PBB SHADOW E&M-EST. PATIENT-LVL II: ICD-10-PCS | Mod: PBBFAC,,, | Performed by: OBSTETRICS & GYNECOLOGY

## 2023-04-05 PROCEDURE — 0502F PR SUBSEQUENT PRENATAL CARE: ICD-10-PCS | Mod: CPTII,S$GLB,, | Performed by: OBSTETRICS & GYNECOLOGY

## 2023-04-05 PROCEDURE — 0502F SUBSEQUENT PRENATAL CARE: CPT | Mod: CPTII,S$GLB,, | Performed by: OBSTETRICS & GYNECOLOGY

## 2023-04-05 NOTE — H&P
HISTORY AND PHYSICAL                                                OBSTETRICS          Subjective:       Yenifer Quezada is a 22 y.o.  female with IUP at 39w1d weeks gestation who presents for elective induction of labor.    Patient denies contractions, denies vaginal bleeding, denies LOF.   Fetal Movement: normal.    This IUP is complicated by +EIF and slightly enlarged lateral ventricles that resolved on ultrasound (mat21 NEG), GBS+ (PCN allergy, Clinda sensitive) and excessive maternal weight gain (50#).    Review of Systems   Constitutional:  Negative for chills and fever.   Eyes:  Negative for visual disturbance.   Respiratory:  Negative for cough and shortness of breath.    Cardiovascular:  Negative for chest pain and leg swelling.   Gastrointestinal:  Negative for abdominal pain, nausea and vomiting.   Genitourinary:  Negative for dysuria, flank pain, frequency, urgency and vaginal bleeding.   Integumentary:  Negative for breast mass.   Neurological:  Negative for syncope and headaches.   Psychiatric/Behavioral:  Negative for depression. The patient is not nervous/anxious.    All other systems reviewed and are negative.  Breast: Negative for mass and mastodynia    PMHx: No past medical history on file.    PSHx:   Past Surgical History:   Procedure Laterality Date    KNEE SURGERY Left 2015    Dr King        All:   Review of patient's allergies indicates:   Allergen Reactions    Pcn [penicillins] Rash       Meds: (Not in a hospital admission)      SH:   Social History     Socioeconomic History    Marital status: Single   Tobacco Use    Smoking status: Never    Smokeless tobacco: Never   Substance and Sexual Activity    Alcohol use: No    Drug use: No    Sexual activity: Yes     Partners: Male       FH:   Family History   Problem Relation Age of Onset    Breast cancer Paternal Grandmother     No Known Problems Father     No Known Problems Mother     No Known Problems Brother     Colon cancer Neg Hx      Ovarian cancer Neg Hx        OBHx:   OB History    Para Term  AB Living   1 0 0 0 0 0   SAB IAB Ectopic Multiple Live Births   0 0 0 0 0      # Outcome Date GA Lbr Kwasi/2nd Weight Sex Delivery Anes PTL Lv   1 Current                Objective:       /80   Wt 88 kg (194 lb 0.1 oz)   LMP 2022   BMI 33.83 kg/m²     Vitals:    23 0928   BP: 122/80   Weight: 88 kg (194 lb 0.1 oz)       General:   alert, appears stated age and cooperative, no apparent distress   HENT:  normocephalic, atraumatic   Eyes:  extraocular movements and conjunctivae normal   Neck:  supple, range of motion normal, no thyromegaly   Lungs:   no respiratory distress   Heart:   regular rate   Abdomen:  soft, non-tender, non-distended but gravid, no rebound or guarding   Extremities negative edema, negative erythema   FHT: To be performed upon admission                 TOCO: To be performed upon admission   Presentations: To be performed upon admission   Cervix: To be performed upon admission   Sterile Speculum Exam: N/A    EFW by Leopold's: 7#    Recent Growth Scan: 2023        32w 1d       1867g    25%    Lab Review  Blood Type A POS  GBBS: positive  Rubella: Immune  RPR: NR  HIV: negative  HepB: negative       Assessment:       39w1d weeks gestation for elective IOL    Plan:      Risks, benefits, alternatives and possible complications have been discussed in detail with the patient.   - Consents signed and to chart  - Admit to Labor and Delivery unit  - Epidural per Anesthesia  - Draw CBC, T&S  - IOL plan per L&D team  - EFW 7#  - Maternal pelvis unproven  - MOC/MOF: pills/breast    GBS+  - PCN allergy (rash)  - Clinda sensitive    Excessive maternal weight gain  - 32wk US AGA  - 1 hour GTT WNL    Post-Partum Hemorrhage risk - low

## 2023-04-05 NOTE — PROGRESS NOTES
Pregnancy dating, labs, ultrasound reports, prenatal testing, and problem list; prior records and results; and available outside records were reviewed and updated in EMR.  Pertinent findings were noted below.    Reason for Visit: Routine Prenatal Visit    38w1d by Estimated Date of Delivery: 23    Blood pressure 122/80, weight 88 kg (194 lb 0.1 oz), last menstrual period 2022.  TWG 50#  SVE /-3    False labor at or after 37 completed weeks of gestation  -     Prenatal Testing - NST/LEONARDA; Standing    Excessive weight gain during pregnancy, antepartum      Occasional cramps or contractions, not consistent.  No bleeding or loss of fluid.  +fetal movement.  Labs reviewed and up to date.  Outpatient cervical ripening balloon scheduled the morning of IOL. PNT to be done after.  MOF: breast, picking up breast pump today  MOC: COCs to start at 6wk post partum  MOD: anticipate     Labor, Pain and bleeding, preE, and fetal movement count precautions given  Follow-up: 1 week

## 2023-04-12 ENCOUNTER — ANESTHESIA (OUTPATIENT)
Dept: OBSTETRICS AND GYNECOLOGY | Facility: OTHER | Age: 23
End: 2023-04-12
Payer: COMMERCIAL

## 2023-04-12 ENCOUNTER — HOSPITAL ENCOUNTER (INPATIENT)
Facility: OTHER | Age: 23
LOS: 3 days | Discharge: HOME | End: 2023-04-15
Attending: OBSTETRICS & GYNECOLOGY | Admitting: STUDENT IN AN ORGANIZED HEALTH CARE EDUCATION/TRAINING PROGRAM
Payer: COMMERCIAL

## 2023-04-12 ENCOUNTER — PATIENT MESSAGE (OUTPATIENT)
Dept: OBSTETRICS AND GYNECOLOGY | Facility: OTHER | Age: 23
End: 2023-04-12
Payer: COMMERCIAL

## 2023-04-12 ENCOUNTER — HOSPITAL ENCOUNTER (OUTPATIENT)
Dept: PERINATAL CARE | Facility: OTHER | Age: 23
Discharge: HOME | End: 2023-04-12
Attending: OBSTETRICS & GYNECOLOGY
Payer: COMMERCIAL

## 2023-04-12 ENCOUNTER — ROUTINE PRENATAL (OUTPATIENT)
Dept: OBSTETRICS AND GYNECOLOGY | Facility: CLINIC | Age: 23
End: 2023-04-12
Payer: COMMERCIAL

## 2023-04-12 ENCOUNTER — ANESTHESIA EVENT (OUTPATIENT)
Dept: OBSTETRICS AND GYNECOLOGY | Facility: OTHER | Age: 23
End: 2023-04-12
Payer: COMMERCIAL

## 2023-04-12 VITALS
SYSTOLIC BLOOD PRESSURE: 122 MMHG | WEIGHT: 200.38 LBS | DIASTOLIC BLOOD PRESSURE: 74 MMHG | BODY MASS INDEX: 34.94 KG/M2

## 2023-04-12 DIAGNOSIS — Z34.03 ENCOUNTER FOR SUPERVISION OF NORMAL FIRST PREGNANCY IN THIRD TRIMESTER: Primary | ICD-10-CM

## 2023-04-12 DIAGNOSIS — Z34.90 ENCOUNTER FOR INDUCTION OF LABOR: ICD-10-CM

## 2023-04-12 DIAGNOSIS — Z34.03 ENCOUNTER FOR SUPERVISION OF NORMAL FIRST PREGNANCY IN THIRD TRIMESTER: ICD-10-CM

## 2023-04-12 DIAGNOSIS — O47.1 FALSE LABOR AT OR AFTER 37 COMPLETED WEEKS OF GESTATION: ICD-10-CM

## 2023-04-12 LAB
ABO + RH BLD: NORMAL
BASOPHILS # BLD AUTO: 0.02 K/UL (ref 0–0.2)
BASOPHILS NFR BLD: 0.2 % (ref 0–1.9)
BLD GP AB SCN CELLS X3 SERPL QL: NORMAL
DIFFERENTIAL METHOD: ABNORMAL
EOSINOPHIL # BLD AUTO: 0 K/UL (ref 0–0.5)
EOSINOPHIL NFR BLD: 0.3 % (ref 0–8)
ERYTHROCYTE [DISTWIDTH] IN BLOOD BY AUTOMATED COUNT: 13 % (ref 11.5–14.5)
HCT VFR BLD AUTO: 33.2 % (ref 37–48.5)
HGB BLD-MCNC: 11 G/DL (ref 12–16)
IMM GRANULOCYTES # BLD AUTO: 0.03 K/UL (ref 0–0.04)
IMM GRANULOCYTES NFR BLD AUTO: 0.3 % (ref 0–0.5)
LYMPHOCYTES # BLD AUTO: 1.7 K/UL (ref 1–4.8)
LYMPHOCYTES NFR BLD: 15.9 % (ref 18–48)
MCH RBC QN AUTO: 29.6 PG (ref 27–31)
MCHC RBC AUTO-ENTMCNC: 33.1 G/DL (ref 32–36)
MCV RBC AUTO: 90 FL (ref 82–98)
MONOCYTES # BLD AUTO: 0.8 K/UL (ref 0.3–1)
MONOCYTES NFR BLD: 7 % (ref 4–15)
NEUTROPHILS # BLD AUTO: 8.1 K/UL (ref 1.8–7.7)
NEUTROPHILS NFR BLD: 76.3 % (ref 38–73)
NRBC BLD-RTO: 0 /100 WBC
PLATELET # BLD AUTO: 222 K/UL (ref 150–450)
PMV BLD AUTO: 10.8 FL (ref 9.2–12.9)
RBC # BLD AUTO: 3.71 M/UL (ref 4–5.4)
SPECIMEN OUTDATE: NORMAL
WBC # BLD AUTO: 10.67 K/UL (ref 3.9–12.7)

## 2023-04-12 PROCEDURE — 99999 PR PBB SHADOW E&M-EST. PATIENT-LVL II: CPT | Mod: PBBFAC,,, | Performed by: ADVANCED PRACTICE MIDWIFE

## 2023-04-12 PROCEDURE — 25000003 PHARM REV CODE 250: Performed by: STUDENT IN AN ORGANIZED HEALTH CARE EDUCATION/TRAINING PROGRAM

## 2023-04-12 PROCEDURE — 99212 OFFICE O/P EST SF 10 MIN: CPT | Mod: PBBFAC,25,TH,PN | Performed by: ADVANCED PRACTICE MIDWIFE

## 2023-04-12 PROCEDURE — 86900 BLOOD TYPING SEROLOGIC ABO: CPT | Performed by: OBSTETRICS & GYNECOLOGY

## 2023-04-12 PROCEDURE — 62326 NJX INTERLAMINAR LMBR/SAC: CPT | Performed by: STUDENT IN AN ORGANIZED HEALTH CARE EDUCATION/TRAINING PROGRAM

## 2023-04-12 PROCEDURE — 27200710 HC EPIDURAL INFUSION PUMP SET: Performed by: SURGERY

## 2023-04-12 PROCEDURE — 51702 INSERT TEMP BLADDER CATH: CPT

## 2023-04-12 PROCEDURE — 99999 PR PBB SHADOW E&M-EST. PATIENT-LVL II: ICD-10-PCS | Mod: PBBFAC,,, | Performed by: ADVANCED PRACTICE MIDWIFE

## 2023-04-12 PROCEDURE — 59200 PR INSERT CERVICAL DILATOR: ICD-10-PCS | Mod: S$GLB,,, | Performed by: ADVANCED PRACTICE MIDWIFE

## 2023-04-12 PROCEDURE — 59025 FETAL NON-STRESS TEST: CPT

## 2023-04-12 PROCEDURE — C1751 CATH, INF, PER/CENT/MIDLINE: HCPCS | Performed by: SURGERY

## 2023-04-12 PROCEDURE — 63600175 PHARM REV CODE 636 W HCPCS

## 2023-04-12 PROCEDURE — 85025 COMPLETE CBC W/AUTO DIFF WBC: CPT | Performed by: OBSTETRICS & GYNECOLOGY

## 2023-04-12 PROCEDURE — 59200 INSERT CERVICAL DILATOR: CPT | Mod: S$GLB,,, | Performed by: ADVANCED PRACTICE MIDWIFE

## 2023-04-12 PROCEDURE — 11000001 HC ACUTE MED/SURG PRIVATE ROOM

## 2023-04-12 PROCEDURE — 0502F SUBSEQUENT PRENATAL CARE: CPT | Mod: CPTII,S$GLB,, | Performed by: ADVANCED PRACTICE MIDWIFE

## 2023-04-12 PROCEDURE — 59409 PRA ETRICAL CARE,VAG DELIV ONLY: ICD-10-PCS | Mod: ,,, | Performed by: SURGERY

## 2023-04-12 PROCEDURE — 59409 OBSTETRICAL CARE: CPT | Mod: ,,, | Performed by: SURGERY

## 2023-04-12 PROCEDURE — 59025 FETAL NON-STRESS TEST: CPT | Mod: 26,,, | Performed by: OBSTETRICS & GYNECOLOGY

## 2023-04-12 PROCEDURE — 25000003 PHARM REV CODE 250

## 2023-04-12 PROCEDURE — 59025 PRENATAL TESTING - NST/AFI: ICD-10-PCS | Mod: 26,,, | Performed by: OBSTETRICS & GYNECOLOGY

## 2023-04-12 PROCEDURE — 59200 INSERT CERVICAL DILATOR: CPT | Mod: PBBFAC,PN | Performed by: ADVANCED PRACTICE MIDWIFE

## 2023-04-12 PROCEDURE — 0502F PR SUBSEQUENT PRENATAL CARE: ICD-10-PCS | Mod: CPTII,S$GLB,, | Performed by: ADVANCED PRACTICE MIDWIFE

## 2023-04-12 RX ORDER — SIMETHICONE 80 MG
1 TABLET,CHEWABLE ORAL 4 TIMES DAILY PRN
Status: DISCONTINUED | OUTPATIENT
Start: 2023-04-12 | End: 2023-04-13

## 2023-04-12 RX ORDER — OXYTOCIN/RINGER'S LACTATE 30/500 ML
334 PLASTIC BAG, INJECTION (ML) INTRAVENOUS ONCE
Status: DISCONTINUED | OUTPATIENT
Start: 2023-04-12 | End: 2023-04-13

## 2023-04-12 RX ORDER — FENTANYL/BUPIVACAINE/NS/PF 2MCG/ML-.1
PLASTIC BAG, INJECTION (ML) INJECTION CONTINUOUS PRN
Status: DISCONTINUED | OUTPATIENT
Start: 2023-04-12 | End: 2023-04-13

## 2023-04-12 RX ORDER — TRANEXAMIC ACID 10 MG/ML
1000 INJECTION, SOLUTION INTRAVENOUS ONCE AS NEEDED
Status: DISCONTINUED | OUTPATIENT
Start: 2023-04-12 | End: 2023-04-13

## 2023-04-12 RX ORDER — FENTANYL/BUPIVACAINE/NS/PF 2MCG/ML-.1
PLASTIC BAG, INJECTION (ML) INJECTION
Status: COMPLETED
Start: 2023-04-12 | End: 2023-04-12

## 2023-04-12 RX ORDER — PROCHLORPERAZINE EDISYLATE 5 MG/ML
5 INJECTION INTRAMUSCULAR; INTRAVENOUS EVERY 6 HOURS PRN
Status: DISCONTINUED | OUTPATIENT
Start: 2023-04-12 | End: 2023-04-13

## 2023-04-12 RX ORDER — CEFAZOLIN SODIUM 2 G/50ML
2 SOLUTION INTRAVENOUS ONCE AS NEEDED
Status: DISCONTINUED | OUTPATIENT
Start: 2023-04-12 | End: 2023-04-13

## 2023-04-12 RX ORDER — CALCIUM CARBONATE 200(500)MG
500 TABLET,CHEWABLE ORAL 3 TIMES DAILY PRN
Status: DISCONTINUED | OUTPATIENT
Start: 2023-04-12 | End: 2023-04-13

## 2023-04-12 RX ORDER — OXYTOCIN 10 [USP'U]/ML
10 INJECTION, SOLUTION INTRAMUSCULAR; INTRAVENOUS ONCE AS NEEDED
Status: DISCONTINUED | OUTPATIENT
Start: 2023-04-12 | End: 2023-04-13

## 2023-04-12 RX ORDER — LIDOCAINE HYDROCHLORIDE 10 MG/ML
10 INJECTION INFILTRATION; PERINEURAL ONCE AS NEEDED
Status: DISCONTINUED | OUTPATIENT
Start: 2023-04-12 | End: 2023-04-13

## 2023-04-12 RX ORDER — OXYTOCIN/RINGER'S LACTATE 30/500 ML
334 PLASTIC BAG, INJECTION (ML) INTRAVENOUS ONCE AS NEEDED
Status: COMPLETED | OUTPATIENT
Start: 2023-04-12 | End: 2023-04-13

## 2023-04-12 RX ORDER — MISOPROSTOL 200 UG/1
800 TABLET ORAL ONCE AS NEEDED
Status: DISCONTINUED | OUTPATIENT
Start: 2023-04-12 | End: 2023-04-13

## 2023-04-12 RX ORDER — CARBOPROST TROMETHAMINE 250 UG/ML
250 INJECTION, SOLUTION INTRAMUSCULAR
Status: DISCONTINUED | OUTPATIENT
Start: 2023-04-12 | End: 2023-04-13

## 2023-04-12 RX ORDER — SODIUM CHLORIDE, SODIUM LACTATE, POTASSIUM CHLORIDE, CALCIUM CHLORIDE 600; 310; 30; 20 MG/100ML; MG/100ML; MG/100ML; MG/100ML
INJECTION, SOLUTION INTRAVENOUS CONTINUOUS
Status: DISCONTINUED | OUTPATIENT
Start: 2023-04-12 | End: 2023-04-13

## 2023-04-12 RX ORDER — ONDANSETRON 8 MG/1
8 TABLET, ORALLY DISINTEGRATING ORAL EVERY 8 HOURS PRN
Status: DISCONTINUED | OUTPATIENT
Start: 2023-04-12 | End: 2023-04-13

## 2023-04-12 RX ORDER — OXYTOCIN/RINGER'S LACTATE 30/500 ML
95 PLASTIC BAG, INJECTION (ML) INTRAVENOUS ONCE
Status: DISCONTINUED | OUTPATIENT
Start: 2023-04-12 | End: 2023-04-13

## 2023-04-12 RX ORDER — OXYTOCIN/RINGER'S LACTATE 30/500 ML
95 PLASTIC BAG, INJECTION (ML) INTRAVENOUS ONCE AS NEEDED
Status: DISCONTINUED | OUTPATIENT
Start: 2023-04-12 | End: 2023-04-13

## 2023-04-12 RX ORDER — METHYLERGONOVINE MALEATE 0.2 MG/ML
200 INJECTION INTRAVENOUS
Status: DISCONTINUED | OUTPATIENT
Start: 2023-04-12 | End: 2023-04-13

## 2023-04-12 RX ORDER — OXYTOCIN/RINGER'S LACTATE 30/500 ML
0-30 PLASTIC BAG, INJECTION (ML) INTRAVENOUS CONTINUOUS
Status: DISCONTINUED | OUTPATIENT
Start: 2023-04-12 | End: 2023-04-13

## 2023-04-12 RX ORDER — CLINDAMYCIN PHOSPHATE 900 MG/50ML
900 INJECTION, SOLUTION INTRAVENOUS
Status: DISCONTINUED | OUTPATIENT
Start: 2023-04-12 | End: 2023-04-13

## 2023-04-12 RX ORDER — LIDOCAINE HYDROCHLORIDE AND EPINEPHRINE 15; 5 MG/ML; UG/ML
INJECTION, SOLUTION EPIDURAL
Status: DISCONTINUED | OUTPATIENT
Start: 2023-04-12 | End: 2023-04-13

## 2023-04-12 RX ADMIN — LIDOCAINE HYDROCHLORIDE,EPINEPHRINE BITARTRATE 3 ML: 15; .005 INJECTION, SOLUTION EPIDURAL; INFILTRATION; INTRACAUDAL; PERINEURAL at 10:04

## 2023-04-12 RX ADMIN — Medication 8 ML/HR: at 10:04

## 2023-04-12 RX ADMIN — CLINDAMYCIN PHOSPHATE 900 MG: 900 INJECTION, SOLUTION INTRAVENOUS at 06:04

## 2023-04-12 RX ADMIN — SODIUM CHLORIDE, POTASSIUM CHLORIDE, SODIUM LACTATE AND CALCIUM CHLORIDE: 600; 310; 30; 20 INJECTION, SOLUTION INTRAVENOUS at 06:04

## 2023-04-12 RX ADMIN — Medication 4 MILLI-UNITS/MIN: at 06:04

## 2023-04-12 NOTE — INTERVAL H&P NOTE
Yenifer Quezada is 22 y.o.  at 39w1d wga presenting for IOL.     FHT: Baseline 130 bpm, moderate BTBV, +accels, -decels; Cat 1 (reassuring)  [place]: q 2-5 mins  Presentation: cephalic by ultrasound    SVE: /-2    IOL   Risks, benefits, alternatives and possible complications have been discussed in detail with the patient.   - Consents signed and to chart  - Admit to Labor and Delivery unit  - Pitocin ordered   - s/p outpatient pablo balloon   - Epidural per Anesthesia  - Draw CBC, T&S  - Notify Staff  - Recheck in 4 hrs or PRN    GBS Pos  -PCN allergic   - Clindamycin ordered per protocol     Fetal Cerebral Ventriculomegaly   - Now resolved     Contraception: POPs    Carol Fernando MD PGY-1  Obstetrics and Gynecology  [facility]      Active Hospital Problems    Diagnosis  POA    *Encounter for induction of labor [Z34.90]  Not Applicable      Resolved Hospital Problems   No resolved problems to display.

## 2023-04-12 NOTE — PROGRESS NOTES
Pregnancy dating, labs, ultrasound reports, prenatal testing, and problem list; prior records and results; and available outside records were reviewed and updated in EMR.  Pertinent findings were noted below.    Reason for Visit   Routine Prenatal Visit    HPI   22 y.o., at 39w1d by Estimated Date of Delivery: 23    Doing well with no complaints. Here for pablo balloon placement prior to induction tonight    Contractions: Yes - irregular   Bleeding: No   Loss of fluid: No   Fetal movement: Yes   Nausea: No   Vomiting: No   Headache: No     Exam   /74   Wt 90.9 kg (200 lb 6.4 oz)   LMP 2022   BMI 34.94 kg/m²     GENERAL: No acute distress  ABD: Gravid  Fundal height: 38 cm   FHT: 140 bpm    Assessment and Plan   Encounter for supervision of normal first pregnancy in third trimester       Pablo balloon placed without difficulty, see procedure not, patient to present for PNT appointment     labor and Labor precautions given  Follow-up: for postpartum visit    Juliana Pascual MD  PGY-2 OB/GYN

## 2023-04-12 NOTE — PROCEDURES
Patient is a  at 39w1d for presents for outpatient cervical ripening. She reports good fetal movement and denies regular contractions, loss of fluid or vaginal bleeding.      Risks and benefits of outpatient cervical ripening discussed with patient and delivery consents previously signed. All questions answered.    Risks of outpatient cervical ripening include:    Vaginal bleeding    Vaginal and Abdominal pain  Infection    Premature Rupture of Membranes     SVE performed prior to balloon insertion. /-3. Enrique balloon inserted without difficulty. Patient education provided to patient.      Plan: Patient to report to Prenatal Testing for NST. If reassuring NST, patient will be discharged home with plans to return at scheduled induction time tonight at 5 pm. Patient instructed to notify L&D if balloon is displaced or if ROM occurs secondary to GBS + status.     Juliana Pascual MD  PGY-2 OB/GYN

## 2023-04-12 NOTE — ANESTHESIA PREPROCEDURE EVALUATION
[facility]  Anesthesia Pre-Operative Evaluation         Patient Name: Yenifer Quezada  YOB: 2000  MRN: [id number]    2023      Yenifer Quezada is a 22 y.o. female  @ 39w1d who presents for a scheduled IOL.  She denies any cardiopulmonary disease, problems with anesthetics, back surgeries, bleeding diathesis or recent blood thinner use.         OB History    Para Term  AB Living   1 0 0 0 0 0   SAB IAB Ectopic Multiple Live Births   0 0 0 0 0      # Outcome Date GA Lbr Kwasi/2nd Weight Sex Delivery Anes PTL Lv   1 Current                Review of patient's allergies indicates:   Allergen Reactions    Pcn [penicillins] Rash       Wt Readings from Last 1 Encounters:   23 0925 90.9 kg (200 lb 6.4 oz)       BP Readings from Last 3 Encounters:   23 130/80   23 122/74   23 122/80       Patient Active Problem List   Diagnosis    Supervision of normal first pregnancy, antepartum    Echogenic intracardiac focus of fetus on prenatal ultrasound    Pregnancy complicated by fetal cerebral ventriculomegaly    PCOS (polycystic ovarian syndrome)    GBS (group B Streptococcus carrier), +RV culture, currently pregnant    Encounter for induction of labor       Past Surgical History:   Procedure Laterality Date    KNEE SURGERY Left 2015    Dr King        Social History     Socioeconomic History    Marital status: Single   Tobacco Use    Smoking status: Never    Smokeless tobacco: Never   Substance and Sexual Activity    Alcohol use: No    Drug use: No    Sexual activity: Yes     Partners: Male         Chemistry        Component Value Date/Time     2022 1051    K 3.9 2022 1051     2022 1051    CO2 21 (L) 2022 1051    BUN 9 2022 1051    CREATININE 0.7 2022 1051    GLU 82 2022 1051         Component Value Date/Time    CALCIUM 9.3 09/02/2022 1051    ALKPHOS 50 02/14/2020 1600    AST 13 02/14/2020 1600    ALT 10 02/14/2020 1600    BILITOT 0.4 02/14/2020 1600    ESTGFRAFRICA SEE COMMENT 02/14/2020 1600    EGFRNONAA SEE COMMENT 02/14/2020 1600            Lab Results   Component Value Date    WBC 8.22 03/22/2023    HGB 11.6 (L) 03/22/2023    HCT 34.9 (L) 03/22/2023    MCV 93 03/22/2023     03/22/2023       No results for input(s): PT, INR, PROTIME, APTT in the last 72 hours.          Pre-op Assessment    I have reviewed the Patient Summary Reports.     I have reviewed the Nursing Notes.    I have reviewed the Medications.     Review of Systems  Anesthesia Hx:  No previous Anesthesia   Denies Personal Hx of Anesthesia complications.   Social:  Non-Smoker    Hematology/Oncology:     Oncology Normal    -- Denies Anemia:   EENT/Dental:EENT/Dental Normal   Cardiovascular:   Exercise tolerance: good Denies Hypertension.     Pulmonary:   Denies COPD.  Denies Asthma.    Renal/:  Renal/ Normal     Hepatic/GI:  Hepatic/GI Normal    Musculoskeletal:  Musculoskeletal Normal    OB/GYN/PEDS:  PCOS   Neurological:  Neurology Normal Denies TIA. Denies Seizures.    Endocrine:   Denies Diabetes. Denies Hypothyroidism.    Psych:  Psychiatric Normal           Physical Exam  General: Well nourished, Cooperative, Alert and Oriented    Airway:  Mallampati: II   Mouth Opening: Normal  TM Distance: Normal  Tongue: Normal  Neck ROM: Normal ROM    Dental:  Intact    Heart:  Rate: Normal  Rhythm: Regular Rhythm        Anesthesia Plan  Type of Anesthesia, risks & benefits discussed:    Anesthesia Type: Gen ETT, CSE, Epidural  Intra-op Monitoring Plan: Standard ASA Monitors  Post Op Pain Control Plan: multimodal analgesia and IV/PO Opioids PRN  Induction:  IV  Informed Consent: Patient consented to blood products? Yes  ASA Score: 2  Day of Surgery Review of History & Physical: H&P Update referred to the  surgeon/provider.    Ready For Surgery From Anesthesia Perspective.     .

## 2023-04-13 PROBLEM — Z34.90 ENCOUNTER FOR INDUCTION OF LABOR: Status: RESOLVED | Noted: 2023-04-12 | Resolved: 2023-04-13

## 2023-04-13 PROCEDURE — 72100003 HC LABOR CARE, EA. ADDL. 8 HRS

## 2023-04-13 PROCEDURE — 63600175 PHARM REV CODE 636 W HCPCS

## 2023-04-13 PROCEDURE — 25000003 PHARM REV CODE 250

## 2023-04-13 PROCEDURE — 59409 OBSTETRICAL CARE: CPT | Mod: GB,,, | Performed by: OBSTETRICS & GYNECOLOGY

## 2023-04-13 PROCEDURE — 27000181 HC CABLE, IUPC

## 2023-04-13 PROCEDURE — 72100002 HC LABOR CARE, 1ST 8 HOURS

## 2023-04-13 PROCEDURE — 25000003 PHARM REV CODE 250: Performed by: STUDENT IN AN ORGANIZED HEALTH CARE EDUCATION/TRAINING PROGRAM

## 2023-04-13 PROCEDURE — 11000001 HC ACUTE MED/SURG PRIVATE ROOM

## 2023-04-13 PROCEDURE — 59409 PR OBSTETRICAL CARE,VAG DELIV ONLY: ICD-10-PCS | Mod: GB,,, | Performed by: OBSTETRICS & GYNECOLOGY

## 2023-04-13 PROCEDURE — 63600175 PHARM REV CODE 636 W HCPCS: Performed by: STUDENT IN AN ORGANIZED HEALTH CARE EDUCATION/TRAINING PROGRAM

## 2023-04-13 PROCEDURE — 72200005 HC VAGINAL DELIVERY LEVEL II

## 2023-04-13 RX ORDER — SODIUM CHLORIDE 0.9 % (FLUSH) 0.9 %
10 SYRINGE (ML) INJECTION
Status: DISCONTINUED | OUTPATIENT
Start: 2023-04-13 | End: 2023-04-15 | Stop reason: HOSPADM

## 2023-04-13 RX ORDER — OXYTOCIN/RINGER'S LACTATE 30/500 ML
95 PLASTIC BAG, INJECTION (ML) INTRAVENOUS ONCE AS NEEDED
Status: COMPLETED | OUTPATIENT
Start: 2023-04-13 | End: 2023-04-13

## 2023-04-13 RX ORDER — METHYLERGONOVINE MALEATE 0.2 MG/ML
200 INJECTION INTRAVENOUS
Status: DISCONTINUED | OUTPATIENT
Start: 2023-04-13 | End: 2023-04-15 | Stop reason: HOSPADM

## 2023-04-13 RX ORDER — OXYTOCIN/RINGER'S LACTATE 30/500 ML
95 PLASTIC BAG, INJECTION (ML) INTRAVENOUS ONCE
Status: DISCONTINUED | OUTPATIENT
Start: 2023-04-13 | End: 2023-04-15 | Stop reason: HOSPADM

## 2023-04-13 RX ORDER — CARBOPROST TROMETHAMINE 250 UG/ML
250 INJECTION, SOLUTION INTRAMUSCULAR
Status: DISCONTINUED | OUTPATIENT
Start: 2023-04-13 | End: 2023-04-15 | Stop reason: HOSPADM

## 2023-04-13 RX ORDER — DOCUSATE SODIUM 100 MG/1
200 CAPSULE, LIQUID FILLED ORAL 2 TIMES DAILY PRN
Qty: 30 CAPSULE | Refills: 1 | Status: SHIPPED | OUTPATIENT
Start: 2023-04-13 | End: 2023-05-30

## 2023-04-13 RX ORDER — ACETAMINOPHEN 325 MG/1
650 TABLET ORAL EVERY 6 HOURS PRN
Status: DISCONTINUED | OUTPATIENT
Start: 2023-04-13 | End: 2023-04-15 | Stop reason: HOSPADM

## 2023-04-13 RX ORDER — ACETAMINOPHEN AND CODEINE PHOSPHATE 120; 12 MG/5ML; MG/5ML
1 SOLUTION ORAL DAILY
Qty: 28 TABLET | Refills: 12 | Status: SHIPPED | OUTPATIENT
Start: 2023-04-13 | End: 2023-04-14 | Stop reason: HOSPADM

## 2023-04-13 RX ORDER — DIPHENHYDRAMINE HCL 25 MG
25 CAPSULE ORAL EVERY 4 HOURS PRN
Status: DISCONTINUED | OUTPATIENT
Start: 2023-04-13 | End: 2023-04-15 | Stop reason: HOSPADM

## 2023-04-13 RX ORDER — OXYTOCIN/RINGER'S LACTATE 30/500 ML
334 PLASTIC BAG, INJECTION (ML) INTRAVENOUS ONCE AS NEEDED
Status: DISCONTINUED | OUTPATIENT
Start: 2023-04-13 | End: 2023-04-15 | Stop reason: HOSPADM

## 2023-04-13 RX ORDER — TRANEXAMIC ACID 10 MG/ML
1000 INJECTION, SOLUTION INTRAVENOUS ONCE AS NEEDED
Status: DISCONTINUED | OUTPATIENT
Start: 2023-04-13 | End: 2023-04-15 | Stop reason: HOSPADM

## 2023-04-13 RX ORDER — FAMOTIDINE 10 MG/ML
20 INJECTION INTRAVENOUS ONCE
Status: DISCONTINUED | OUTPATIENT
Start: 2023-04-14 | End: 2023-04-15 | Stop reason: HOSPADM

## 2023-04-13 RX ORDER — PRENATAL WITH FERROUS FUM AND FOLIC ACID 3080; 920; 120; 400; 22; 1.84; 3; 20; 10; 1; 12; 200; 27; 25; 2 [IU]/1; [IU]/1; MG/1; [IU]/1; MG/1; MG/1; MG/1; MG/1; MG/1; MG/1; UG/1; MG/1; MG/1; MG/1; MG/1
1 TABLET ORAL DAILY
Status: DISCONTINUED | OUTPATIENT
Start: 2023-04-13 | End: 2023-04-15 | Stop reason: HOSPADM

## 2023-04-13 RX ORDER — ONDANSETRON 8 MG/1
8 TABLET, ORALLY DISINTEGRATING ORAL EVERY 8 HOURS PRN
Status: DISCONTINUED | OUTPATIENT
Start: 2023-04-13 | End: 2023-04-15 | Stop reason: HOSPADM

## 2023-04-13 RX ORDER — IBUPROFEN 600 MG/1
600 TABLET ORAL EVERY 6 HOURS
Status: DISCONTINUED | OUTPATIENT
Start: 2023-04-13 | End: 2023-04-15 | Stop reason: HOSPADM

## 2023-04-13 RX ORDER — PROCHLORPERAZINE EDISYLATE 5 MG/ML
5 INJECTION INTRAMUSCULAR; INTRAVENOUS EVERY 6 HOURS PRN
Status: DISCONTINUED | OUTPATIENT
Start: 2023-04-13 | End: 2023-04-15 | Stop reason: HOSPADM

## 2023-04-13 RX ORDER — MISOPROSTOL 200 UG/1
800 TABLET ORAL
Status: DISCONTINUED | OUTPATIENT
Start: 2023-04-13 | End: 2023-04-15 | Stop reason: HOSPADM

## 2023-04-13 RX ORDER — DIPHENHYDRAMINE HYDROCHLORIDE 50 MG/ML
25 INJECTION INTRAMUSCULAR; INTRAVENOUS EVERY 4 HOURS PRN
Status: DISCONTINUED | OUTPATIENT
Start: 2023-04-13 | End: 2023-04-15 | Stop reason: HOSPADM

## 2023-04-13 RX ORDER — HYDROCODONE BITARTRATE AND ACETAMINOPHEN 10; 325 MG/1; MG/1
1 TABLET ORAL EVERY 4 HOURS PRN
Status: DISCONTINUED | OUTPATIENT
Start: 2023-04-13 | End: 2023-04-15 | Stop reason: HOSPADM

## 2023-04-13 RX ORDER — BUPIVACAINE HYDROCHLORIDE 2.5 MG/ML
INJECTION, SOLUTION INFILTRATION; PERINEURAL
Status: DISCONTINUED | OUTPATIENT
Start: 2023-04-13 | End: 2023-04-13

## 2023-04-13 RX ORDER — HYDROCORTISONE 25 MG/G
CREAM TOPICAL 3 TIMES DAILY PRN
Status: DISCONTINUED | OUTPATIENT
Start: 2023-04-13 | End: 2023-04-15 | Stop reason: HOSPADM

## 2023-04-13 RX ORDER — MISOPROSTOL 200 UG/1
800 TABLET ORAL ONCE AS NEEDED
Status: DISCONTINUED | OUTPATIENT
Start: 2023-04-13 | End: 2023-04-15 | Stop reason: HOSPADM

## 2023-04-13 RX ORDER — SODIUM CITRATE AND CITRIC ACID MONOHYDRATE 334; 500 MG/5ML; MG/5ML
30 SOLUTION ORAL ONCE
Status: DISCONTINUED | OUTPATIENT
Start: 2023-04-14 | End: 2023-04-15 | Stop reason: HOSPADM

## 2023-04-13 RX ORDER — HYDROCODONE BITARTRATE AND ACETAMINOPHEN 5; 325 MG/1; MG/1
1 TABLET ORAL EVERY 4 HOURS PRN
Status: DISCONTINUED | OUTPATIENT
Start: 2023-04-13 | End: 2023-04-15 | Stop reason: HOSPADM

## 2023-04-13 RX ORDER — FENTANYL/BUPIVACAINE/NS/PF 2MCG/ML-.1
PLASTIC BAG, INJECTION (ML) INJECTION CONTINUOUS
Status: DISCONTINUED | OUTPATIENT
Start: 2023-04-14 | End: 2023-04-15 | Stop reason: HOSPADM

## 2023-04-13 RX ORDER — OXYTOCIN 10 [USP'U]/ML
10 INJECTION, SOLUTION INTRAMUSCULAR; INTRAVENOUS ONCE AS NEEDED
Status: DISCONTINUED | OUTPATIENT
Start: 2023-04-13 | End: 2023-04-15 | Stop reason: HOSPADM

## 2023-04-13 RX ORDER — IBUPROFEN 600 MG/1
600 TABLET ORAL EVERY 6 HOURS PRN
Qty: 30 TABLET | Refills: 1 | Status: SHIPPED | OUTPATIENT
Start: 2023-04-13 | End: 2023-05-30

## 2023-04-13 RX ORDER — FENTANYL CITRATE 50 UG/ML
INJECTION, SOLUTION INTRAMUSCULAR; INTRAVENOUS
Status: DISCONTINUED | OUTPATIENT
Start: 2023-04-13 | End: 2023-04-13

## 2023-04-13 RX ORDER — SIMETHICONE 80 MG
1 TABLET,CHEWABLE ORAL EVERY 6 HOURS PRN
Status: DISCONTINUED | OUTPATIENT
Start: 2023-04-13 | End: 2023-04-15 | Stop reason: HOSPADM

## 2023-04-13 RX ORDER — DOCUSATE SODIUM 100 MG/1
200 CAPSULE, LIQUID FILLED ORAL 2 TIMES DAILY PRN
Status: DISCONTINUED | OUTPATIENT
Start: 2023-04-13 | End: 2023-04-15 | Stop reason: HOSPADM

## 2023-04-13 RX ADMIN — IBUPROFEN 600 MG: 600 TABLET, FILM COATED ORAL at 09:04

## 2023-04-13 RX ADMIN — IBUPROFEN 600 MG: 600 TABLET, FILM COATED ORAL at 03:04

## 2023-04-13 RX ADMIN — Medication 95 MILLI-UNITS/MIN: at 12:04

## 2023-04-13 RX ADMIN — SODIUM CHLORIDE, POTASSIUM CHLORIDE, SODIUM LACTATE AND CALCIUM CHLORIDE: 600; 310; 30; 20 INJECTION, SOLUTION INTRAVENOUS at 10:04

## 2023-04-13 RX ADMIN — CLINDAMYCIN PHOSPHATE 900 MG: 900 INJECTION, SOLUTION INTRAVENOUS at 03:04

## 2023-04-13 RX ADMIN — DOCUSATE SODIUM 200 MG: 100 CAPSULE, LIQUID FILLED ORAL at 09:04

## 2023-04-13 RX ADMIN — FENTANYL CITRATE 100 MCG: 0.05 INJECTION, SOLUTION INTRAMUSCULAR; INTRAVENOUS at 11:04

## 2023-04-13 RX ADMIN — BUPIVACAINE HYDROCHLORIDE 6 ML: 2.5 INJECTION, SOLUTION INFILTRATION; PERINEURAL at 10:04

## 2023-04-13 RX ADMIN — Medication 334 MILLI-UNITS/MIN: at 11:04

## 2023-04-13 NOTE — PROGRESS NOTES
"LABOR NOTE    S:  Complaints: No.  Epidural working:  Yes.   Resident to bedside due to patient reporting increased pain with contractions.     O: /85   Pulse 86   Temp 98.3 °F (36.8 °C) (Oral)   Resp 18   Ht 5' 3" (1.6 m)   Wt 90.7 kg (200 lb)   LMP 05/12/2022   SpO2 100%   Breastfeeding No   BMI 35.43 kg/m²     FHT: Baseline 125, mod variability, + accels, - decels; Cat 1 (reassuring)  CTX: q 2-3 minutes  SVE: 8/90/0    TIMELINE:   1800: 4/70/-2, pitocin started   2200: 4/70/-2, AROM Clr   0230: 5/80/-2, IUPC placed   0430: 5/80/-2, pit at 18 mU/min   0730: 6/80/0, pit @ 20 mU/min  0930: 8/90/0  1040: 8/90/0    PLAN:  Anesthesia was called for additional help achieving adequate anesthesia   Continue Close Maternal/Fetal Monitoring  Pitocin Augmentation per protocol  Recheck 2 hours or PRN      Carrie Sutton MD  [facility]   OBGYN PGY1    "

## 2023-04-13 NOTE — ANESTHESIA PROCEDURE NOTES
Epidural    Patient location during procedure: OB   Reason for block: primary anesthetic   Reason for block: labor analgesia requested by patient and obstetrician  Diagnosis: IUP   Start time: 4/12/2023 10:46 PM  Timeout: 4/12/2023 10:45 PM  End time: 4/12/2023 10:58 PM  Surgery related to: vaginal delivery    Staffing  Performing Provider: Jeff Lala MD  Authorizing Provider: Suhail Lucero MD        Preanesthetic Checklist  Completed: patient identified, IV checked, site marked, risks and benefits discussed, surgical consent, monitors and equipment checked, pre-op evaluation, timeout performed, anesthesia consent given, hand hygiene performed and patient being monitored  Preparation  Patient position: sitting  Prep: ChloraPrep  Patient monitoring: Pulse Ox and Blood Pressure  Reason for block: primary anesthetic   Epidural  Skin Anesthetic: lidocaine 1%  Skin Wheal: 3 mL  Administration type: continuous  Approach: midline  Interspace: L3-4    Injection technique: ARTHUR air  Needle and Epidural Catheter  Needle type: Tuohy   Needle gauge: 17  Needle length: 3.5 inches  Needle insertion depth: 6 cm  Catheter type: multi-orifice and springwound  Catheter size: 19 G  Catheter at skin depth: 10 cm  Insertion Attempts: 1  Test dose: 3 mL of lidocaine 1.5% with Epi 1-to-200,000  Additional Documentation: incremental injection, negative aspiration for heme and CSF, no paresthesia on injection and no signs/symptoms of IV or SA injection  Needle localization: anatomical landmarks  Medications:  Volume per aspiration: 5 mL  Time between aspirations: 5 minutes   Assessment  Ease of block: easy  Patient's tolerance of the procedure: comfortable throughout block and no complaints No inadvertent dural puncture with Tuohy.  Dural puncture performed with spinal needle.

## 2023-04-13 NOTE — PROGRESS NOTES
"LABOR NOTE    S:  Complaints: No.  Epidural working:  Yes.   Resident to bedside due to patient continuing to report persistent vaginal pain/pressure    O: /85   Pulse 86   Temp 98.3 °F (36.8 °C) (Oral)   Resp 18   Ht 5' 3" (1.6 m)   Wt 90.7 kg (200 lb)   LMP 2022   SpO2 100%   Breastfeeding No   BMI 35.43 kg/m²     FHT: Baseline 125, mod variability, + accels, prolonged decel to the 90s- will set up to push now; Cat 2 (cat 2)  CTX: q 2-3 minutes  SVE: 10/100/+2    TIMELINE:   1800: /-2, pitocin started   2200: /-2, AROM Clr   0230: /-2, IUPC placed   0430: 80/-2, pit at 18 mU/min   0730: /0, pit @ 20 mU/min  0930: /0  1040: /0  1130: 10/100/+2    PLAN:  Patient being set up to push. Anticipate . OB staff notified       Carrie Sutton MD  [facility]   OBGYN PGY1    "

## 2023-04-13 NOTE — L&D DELIVERY NOTE
"[facility] - Labor & Delivery  Vaginal Delivery   Operative Note    SUMMARY     Normal spontaneous vaginal delivery of live infant, was placed on mothers abdomen for skin to skin and bulb suctioning performed.  Infant delivered position EBENEZER over intact perineum.  Nuchal cord: Yes, cord reduced following delivery.    Spontaneous delivery of placenta and IV pitocin given noting good uterine tone.  Bilateral vaginal side wall lacerations noted and repaired with 3-0 Vicryl .  Patient tolerated delivery well. Sponge needle and lap counted correctly x2.      Carrie Sutton MD  [facility]   OBGYN PGY1    I agree with delivery note. I was personally present during the critical portion(s) of the procedure performed by the resident and was immediately available to provide assistance as needed during the entire procedure.        Indications:  (spontaneous vaginal delivery)  Pregnancy complicated by:   Patient Active Problem List   Diagnosis    Supervision of normal first pregnancy, antepartum    Echogenic intracardiac focus of fetus on prenatal ultrasound    Pregnancy complicated by fetal cerebral ventriculomegaly    PCOS (polycystic ovarian syndrome)    GBS (group B Streptococcus carrier), +RV culture, currently pregnant     (spontaneous vaginal delivery)     Admitting GA: 39w2d    Delivery Information for Agustin Quezada    Birth information:  YOB: 2021   Time of birth: 11:38 AM   Sex: female   Head Delivery Date/Time: 2023 11:37 AM   Delivery type: Vaginal, Spontaneous   Gestational Age: 39w2d  Unknown    Delivery Providers    Delivering clinician: Pamela Velázquez MD   Provider Role    MD Mgagie Acosta, RN     Lea eLvi, AMRIT Ingram, AMRIT Velez MD               Measurements    Weight: 3250 g  Weight (lbs): 7 lb 2.6 oz  Length: 49.5 cm  Length (in): 19.5"  Head circumference: 34.3 cm  Chest circumference: 31.8 cm   "       Apgars    Living status: Living  Apgars:  1 min.:  5 min.:  10 min.:  15 min.:  20 min.:    Skin color:  0  1       Heart rate:  2  2       Reflex irritability:  2  2       Muscle tone:  2  2       Respiratory effort:  2  2       Total:  8  9       Apgars assigned by: AMRIT BUTTS         Operative Delivery    Forceps attempted?: No  Vacuum extractor attempted?: No         Shoulder Dystocia    Shoulder dystocia present?: No           Presentation    Presentation: Vertex  Position: Left Occiput Anterior           Interventions/Resuscitation    Method: Bulb Suctioning, Tactile Stimulation       Cord    Vessels: 3 vessels  Complications: Nuchal  Nuchal Intervention: reduced  Nuchal Cord Description: loose nuchal cord  Number of Loops: 1  Delayed Cord Clamping?: Yes  Cord Clamped Date/Time: 2023 11:39 AM  Cord Blood Disposition: Lab  Gases Sent?: No  Stem Cell Collection (by MD): No       Placenta    Placenta delivery date/time: 2023 1142  Placenta removal: Spontaneous  Placenta appearance: Intact  Placenta disposition: Discarded           Labor Events:       labor: No     Labor Onset Date/Time: 2023 07:20     Dilation Complete Date/Time: 2023 11:20     Start Pushing Date/Time: 2023 11:25       Start Pushing Date/Time: 2023 11:25     Rupture Date/Time: 23           Rupture type: ARM (Artificial Rupture)           Fluid Amount:         Fluid Color: Clear                 steroids: None     Antibiotics given for GBS: Yes     Induction: balloon dilation (Enrique)     Indications for induction:        Augmentation: oxytocin     Indications for augmentation: Ineffective Contraction Pattern     Labor complications: None     Additional complications:          Cervical ripening:                     Delivery:      Episiotomy: None     Indication for Episiotomy:       Perineal Lacerations: None Repaired:      Periurethral Laceration:   Repaired:     Labial Laceration:    Repaired:     Sulcus Laceration:   Repaired:     Vaginal Laceration: Yes Repaired: Yes   Cervical Laceration:   Repaired:     Repair suture: None     Repair # of packets: 1     Last Value - EBL - Nursing (mL):       Sum - EBL - Nursing (mL): 0     Last Value - EBL - Anesthesia (mL):        Calculated QBL (mL): 152        Vaginal Sweep Performed: No     Surgicount Correct: Yes     Vaginal Packing: No Quantity:       Other providers:       Anesthesia    Method: Epidural          Details (if applicable):  Trial of Labor      Categorization:      Priority:     Indications for :     Incision Type:       Additional  information:  Forceps:    Vacuum:    Breech:    Observed anomalies    Other (Comments):

## 2023-04-13 NOTE — PROGRESS NOTES
"LABOR NOTE    S:  Complaints: No.  Epidural working:  no  Resident to bedside for routine cervical check     O: /85   Pulse 85   Temp 98.7 °F (37.1 °C) (Oral)   Resp 19   Ht 5' 3" (1.6 m)   Wt 90.7 kg (200 lb)   LMP 05/12/2022   SpO2 100%   Breastfeeding No   BMI 35.43 kg/m²     FHT: Baseline 120, + accels, - decels Cat 1 (reassuring)  CTX: q 2-3 minutes  SVE: 5/80/-2, IUPC placed     TIMELINE:   1800: 4/70/-2, pitocin started   2200: 4/70/-2, AROM Clr   0230: 5/80/-2, IUPC placed       PLAN:    Continue Close Maternal/Fetal Monitoring  Pitocin Augmentation per protocol  Recheck 2 hours or PRN    Carol Fernando MD PGY-1  Obstetrics and Gynecology  [facility]      "

## 2023-04-13 NOTE — PROGRESS NOTES
"LABOR NOTE    S:  Complaints: No.  Epidural working:  Yes.   Feeling pelvic pressure    O: /85   Pulse 86   Temp 98.3 °F (36.8 °C) (Oral)   Resp 18   Ht 5' 3" (1.6 m)   Wt 90.7 kg (200 lb)   LMP 05/12/2022   SpO2 100%   Breastfeeding No   BMI 35.43 kg/m²     FHT: Baseline 125, mod variability, + accels, - decels; Cat 1 (reassuring)  CTX: q 2-3 minutes  SVE: 8/90/0    TIMELINE:   1800: 4/70/-2, pitocin started   2200: 4/70/-2, AROM Clr   0230: 5/80/-2, IUPC placed   0430: 5/80/-2, pit at 18 mU/min   0730: 6/80/0, pit @ 20 mU/min  0930: 8/90/0    PLAN:  Continue Close Maternal/Fetal Monitoring  Pitocin Augmentation per protocol  Recheck 2 hours or PRN      Carrie Sutton MD  [facility]   OBGYN PGY1    "

## 2023-04-13 NOTE — PROGRESS NOTES
"LABOR NOTE    S:  Complaints: No.  Epidural working:  Yes.   Resident to bedside for routine cervical check     O: /79   Pulse 81   Temp 98.7 °F (37.1 °C) (Oral)   Resp 18   Ht 5' 3" (1.6 m)   Wt 90.7 kg (200 lb)   LMP 05/12/2022   SpO2 100%   Breastfeeding No   BMI 35.43 kg/m²     FHT: Baseline 125, mod variability, + accels, - decels; Cat 1 (reassuring)  CTX: q 2-3 minutes, pit at 18 mU/min   SVE: 5/80/-2    TIMELINE:   1800: 4/70/-2, pitocin started   2200: 4/70/-2, AROM Clr   0230: 5/80/-2, IUPC placed   0430: 5/80/-2, pit at 18 mU/min       PLAN:    Continue Close Maternal/Fetal Monitoring  Pitocin Augmentation per protocol  Recheck 2 hours or PRN    Claudia Soto MD/MPH  OB/GYN PGY-2  [facility]        "

## 2023-04-13 NOTE — PROGRESS NOTES
"LABOR NOTE    S:  Complaints: No.  Epidural working:  no  Resident to bedside for routine cervical check     O: /83   Pulse 85   Temp 98.5 °F (36.9 °C) (Axillary)   Resp 17   Ht 5' 3" (1.6 m)   Wt 90.7 kg (200 lb)   LMP 05/12/2022   SpO2 98%   Breastfeeding No   BMI 35.43 kg/m²     FHT: Baseline 120, + accels, - decels Cat 1 (reassuring)  CTX: q 3-4 minutes  SVE: 4/70/-2    TIMELINE:   1800: 4/70/-2, pitocin started   2200: 4/70/-2, AROM Clr       PLAN:    Continue Close Maternal/Fetal Monitoring  Pitocin Augmentation per protocol  Recheck 2 hours or PRN    Carol Fernando MD PGY-1  Obstetrics and Gynecology  [facility]      "

## 2023-04-13 NOTE — LACTATION NOTE
04/13/23 1700   Maternal Assessment   Breast Shape Bilateral:;round   Breast Density Bilateral:;soft   Areola Bilateral:;elastic   Nipples Bilateral:;everted;short   Maternal Infant Feeding   Maternal Emotional State assist needed   Infant Positioning clutch/football   Signs of Milk Transfer audible swallow;infant jaw motion present   Latch Assistance yes     Assisted pt with position and latch. Baby took several attempts before latching deeply to breast. Good tugs and pulls observed. Basic breastfeeding education provided. Questions answered.skin to skin encouraged.

## 2023-04-13 NOTE — PROGRESS NOTES
"LABOR NOTE    S:  Complaints: No.  Epidural working:  Yes.   Feeling pelvic pressure    O: /72   Pulse 94   Temp 98.7 °F (37.1 °C) (Oral)   Resp 18   Ht 5' 3" (1.6 m)   Wt 90.7 kg (200 lb)   LMP 05/12/2022   SpO2 99%   Breastfeeding No   BMI 35.43 kg/m²     FHT: Baseline 125, mod variability, + accels, - decels; Cat 1 (reassuring)  CTX: q 2-3 minutes, pit at 20 mU/min   SVE: 6/80/0    TIMELINE:   1800: 4/70/-2, pitocin started   2200: 4/70/-2, AROM Clr   0230: 5/80/-2, IUPC placed   0430: 5/80/-2, pit at 18 mU/min   0730: 6/80/0, pit @ 20 mU/min    PLAN:    Continue Close Maternal/Fetal Monitoring  Pitocin Augmentation per protocol  Recheck 2 hours or PRN        "

## 2023-04-13 NOTE — PROGRESS NOTES
04/13/23 0215   TeleStork Stephanie Note - Strip   Strip Reviewed by Stephanie Nurse? Yes   Tele[facility] Stephanie Note - Communication   [place] Nurse Communicated with Bedside Nurse Regarding: Fetal Status   TeleStork Stephanie Note - Notification   Nurse Notified? Yes

## 2023-04-14 ENCOUNTER — TELEPHONE (OUTPATIENT)
Dept: OBSTETRICS AND GYNECOLOGY | Facility: CLINIC | Age: 23
End: 2023-04-14
Payer: COMMERCIAL

## 2023-04-14 LAB
ALBUMIN SERPL BCP-MCNC: 2.3 G/DL (ref 3.5–5.2)
ALP SERPL-CCNC: 103 U/L (ref 55–135)
ALT SERPL W/O P-5'-P-CCNC: 10 U/L (ref 10–44)
ANION GAP SERPL CALC-SCNC: 4 MMOL/L (ref 8–16)
AST SERPL-CCNC: 19 U/L (ref 10–40)
BASOPHILS # BLD AUTO: 0.02 K/UL (ref 0–0.2)
BASOPHILS NFR BLD: 0.2 % (ref 0–1.9)
BILIRUB SERPL-MCNC: 0.3 MG/DL (ref 0.1–1)
BUN SERPL-MCNC: 6 MG/DL (ref 6–20)
CALCIUM SERPL-MCNC: 8.3 MG/DL (ref 8.7–10.5)
CHLORIDE SERPL-SCNC: 112 MMOL/L (ref 95–110)
CO2 SERPL-SCNC: 24 MMOL/L (ref 23–29)
CREAT SERPL-MCNC: 0.6 MG/DL (ref 0.5–1.4)
DIFFERENTIAL METHOD: ABNORMAL
EOSINOPHIL # BLD AUTO: 0.1 K/UL (ref 0–0.5)
EOSINOPHIL NFR BLD: 1.3 % (ref 0–8)
ERYTHROCYTE [DISTWIDTH] IN BLOOD BY AUTOMATED COUNT: 13.2 % (ref 11.5–14.5)
EST. GFR  (NO RACE VARIABLE): >60 ML/MIN/1.73 M^2
GLUCOSE SERPL-MCNC: 84 MG/DL (ref 70–110)
HCT VFR BLD AUTO: 27.8 % (ref 37–48.5)
HGB BLD-MCNC: 9.1 G/DL (ref 12–16)
IMM GRANULOCYTES # BLD AUTO: 0.07 K/UL (ref 0–0.04)
IMM GRANULOCYTES NFR BLD AUTO: 0.8 % (ref 0–0.5)
LYMPHOCYTES # BLD AUTO: 2.3 K/UL (ref 1–4.8)
LYMPHOCYTES NFR BLD: 26.4 % (ref 18–48)
MCH RBC QN AUTO: 29.6 PG (ref 27–31)
MCHC RBC AUTO-ENTMCNC: 32.7 G/DL (ref 32–36)
MCV RBC AUTO: 91 FL (ref 82–98)
MONOCYTES # BLD AUTO: 0.5 K/UL (ref 0.3–1)
MONOCYTES NFR BLD: 5.9 % (ref 4–15)
NEUTROPHILS # BLD AUTO: 5.7 K/UL (ref 1.8–7.7)
NEUTROPHILS NFR BLD: 65.4 % (ref 38–73)
NRBC BLD-RTO: 0 /100 WBC
PLATELET # BLD AUTO: 180 K/UL (ref 150–450)
PMV BLD AUTO: 10.5 FL (ref 9.2–12.9)
POTASSIUM SERPL-SCNC: 3.7 MMOL/L (ref 3.5–5.1)
PROT SERPL-MCNC: 5 G/DL (ref 6–8.4)
RBC # BLD AUTO: 3.07 M/UL (ref 4–5.4)
SODIUM SERPL-SCNC: 140 MMOL/L (ref 136–145)
WBC # BLD AUTO: 8.71 K/UL (ref 3.9–12.7)

## 2023-04-14 PROCEDURE — 36415 COLL VENOUS BLD VENIPUNCTURE: CPT | Performed by: STUDENT IN AN ORGANIZED HEALTH CARE EDUCATION/TRAINING PROGRAM

## 2023-04-14 PROCEDURE — 85025 COMPLETE CBC W/AUTO DIFF WBC: CPT | Performed by: OBSTETRICS & GYNECOLOGY

## 2023-04-14 PROCEDURE — 80053 COMPREHEN METABOLIC PANEL: CPT | Performed by: STUDENT IN AN ORGANIZED HEALTH CARE EDUCATION/TRAINING PROGRAM

## 2023-04-14 PROCEDURE — 11000001 HC ACUTE MED/SURG PRIVATE ROOM

## 2023-04-14 PROCEDURE — 25000003 PHARM REV CODE 250

## 2023-04-14 RX ORDER — FERROUS SULFATE 325(65) MG
325 TABLET, DELAYED RELEASE (ENTERIC COATED) ORAL DAILY
Qty: 60 TABLET | Refills: 3 | Status: SHIPPED | OUTPATIENT
Start: 2023-04-14 | End: 2023-05-30

## 2023-04-14 RX ORDER — LANOLIN ALCOHOL/MO/W.PET/CERES
1 CREAM (GRAM) TOPICAL DAILY
Status: DISCONTINUED | OUTPATIENT
Start: 2023-04-14 | End: 2023-04-15 | Stop reason: HOSPADM

## 2023-04-14 RX ADMIN — DOCUSATE SODIUM 200 MG: 100 CAPSULE, LIQUID FILLED ORAL at 08:04

## 2023-04-14 RX ADMIN — FERROUS SULFATE TAB 325 MG (65 MG ELEMENTAL FE) 1 EACH: 325 (65 FE) TAB at 10:04

## 2023-04-14 RX ADMIN — IBUPROFEN 600 MG: 600 TABLET, FILM COATED ORAL at 12:04

## 2023-04-14 RX ADMIN — DOCUSATE SODIUM 200 MG: 100 CAPSULE, LIQUID FILLED ORAL at 10:04

## 2023-04-14 RX ADMIN — PRENATAL VIT W/ FE FUMARATE-FA TAB 27-0.8 MG 1 TABLET: 27-0.8 TAB at 10:04

## 2023-04-14 RX ADMIN — IBUPROFEN 600 MG: 600 TABLET, FILM COATED ORAL at 05:04

## 2023-04-14 RX ADMIN — IBUPROFEN 600 MG: 600 TABLET, FILM COATED ORAL at 04:04

## 2023-04-14 NOTE — PROGRESS NOTES
POSTPARTUM PROGRESS NOTE    Subjective:     PPD/POD#: 1   Procedure:    EGA: 39w2d   N/V: No   F/C: No   Abd Pain: Mild, well-controlled with oral pain medication   Lochia: Mild   Voiding: Yes   Ambulating: Yes   Bowel fnc: Yes   Breastfeeding: Yes   Contraception: OCPs at postpartum visit   Circumcision: N/A, female     Objective:      Temp:  [98.1 °F (36.7 °C)-98.4 °F (36.9 °C)] 98.1 °F (36.7 °C)  Pulse:  [] 77  Resp:  [18-19] 18  SpO2:  [96 %-100 %] 96 %  BP: (101-134)/(67-91) 118/74    Lung: Normal respiratory effort   Abdomen: Soft, appropriately tender   Uterus: Firm, no fundal tenderness   Incision: N/A   : Deferred   Extremities: Bilateral trace edema     Lab Review    Recent Labs   Lab 23  0527      K 3.7   *   CO2 24   BUN 6   CREATININE 0.6   GLU 84   PROT 5.0*   BILITOT 0.3   ALKPHOS 103   ALT 10   AST 19       Recent Labs   Lab 23  1816 23  0527   WBC 10.67 8.71   HGB 11.0* 9.1*   HCT 33.2* 27.8*   MCV 90 91    180           I/O    Intake/Output Summary (Last 24 hours) at 2023 0654  Last data filed at 2023  Gross per 24 hour   Intake --   Output 1192 ml   Net -1192 ml          Assessment and Plan:   Postpartum care:  - Patient doing well.  - Continue routine management and advances.    Anemia   - H/H as above  - QBL: 100  - asymptomatic  - iron/colace    gHTN  - BP as above  - asymptomatic  - preE labs as above  - Mag: not indicated  - Hypertensive agent not indicated      Carrie Sutton MD  [facility]   OBGYN PGY1

## 2023-04-14 NOTE — PHYSICIAN QUERY
PT Name: Yenifer Quezada  MR #: [id number]    DOCUMENTATION CLARIFICATION      CDS/: ROHIT Maier,RNC-[email or web address]         Contact information:[email or web address]    This form is a permanent document in the medical record.      Query Date: 2023    By submitting this query, we are merely seeking further clarification of documentation. Please utilize your independent clinical judgment when addressing the question(s) below.    The Medical Record contains the following:   Indicators  Supporting Clinical Findings Location in Medical Record   X Anemia documented Anemia Discharge Summary    X H&H Recent Labs   Lab 23  1816 23  0527   WBC 10.67 8.71   HGB 11.0* 9.1*   HCT 33.2* 27.8*    Discharge Summary     BP                    HR      GI bleeding documented     X Acute bleeding (Non GI site) Calculated QBL (mL): 152 L&D Delivery note     Transfusion(s)     X Acute/Chronic illness Labor course was uncomplicated and resulted in  without complications Discharge Summary    X Treatments She was started on iron for anemia.  Discharge Summary     Other       Provider, please further specify the diagnosis of anemia.   [  x ] Acute blood loss anemia on top of chronic anemia   [   ] Iron deficiency anemia    [   ] Other: _________________   [   ] Clinically Undetermined     Please document in your progress notes daily for the duration of treatment, until resolved, and include in your discharge summary.    Form No. [id number]

## 2023-04-14 NOTE — LACTATION NOTE
04/14/23 1030   Maternal Assessment   Breast Shape Bilateral:;round   Breast Density Bilateral:;soft   Areola Bilateral:;elastic   Nipples Bilateral:;everted   Maternal Infant Feeding   Maternal Emotional State assist needed   Infant Positioning clutch/football   Signs of Milk Transfer infant jaw motion present   Pain with Feeding no   Nipple Shape After Feeding, Right round   Latch Assistance yes   Equipment Type   Breast Pump Type double electric, personal  (medela Maxflow)   Breast Pump Flange Type hard   Breast Pump Flange Size 24 mm  (measured flange fit)   Breast Pumping   Breast Pumping Interventions post-feed pumping encouraged;frequent pumping encouraged;early pumping promoted   Breast Pumping hand expression utilized;single electric breast pump utilized;left breast pumped until soft  (demo pump on left breast- easily expressed with pump within few minutes)   Community Referrals   Community Referrals support group;pediatric care provider;outpatient lactation program     LC did discharge lactation teaching and reviewed the Mother's Breastfeeding Guide and reviewed the breastfeeding community resources in the back of the breast feeding guide. LC answered all questions. Mother has  phone number  for questions after DC. Mother had baby latched upon entering room, assisted with deeper latch. Baby with many swallows. Demo'd pt's medela maxflow pump, measured flange fit, within a couple minutes, colostrum flowing in pump. Pt may plan to mostly pump and bottlefeed, discussed all options. Encouraged to nurse and/or pump 8x/day to maintain milk supply.

## 2023-04-14 NOTE — PLAN OF CARE
VSS. Uterus firm w/o massage, bleeding moderate. Pt ambulating and showered independently, voiding spontaneously, passing flatus. Pain managed adequately w/medication. Plan of care reviewed w/pt and significant other. No new concerns expressed at this time.  Will continue to monitor and intervene as necessary.      Problem: Adult Inpatient Plan of Care  Goal: Plan of Care Review  Outcome: Ongoing, Progressing  Goal: Patient-Specific Goal (Individualized)  Outcome: Ongoing, Progressing  Goal: Absence of Hospital-Acquired Illness or Injury  Outcome: Ongoing, Progressing  Goal: Optimal Comfort and Wellbeing  Outcome: Ongoing, Progressing  Goal: Readiness for Transition of Care  Outcome: Ongoing, Progressing     Problem:  Fall Injury Risk  Goal: Absence of Fall, Infant Drop and Related Injury  Outcome: Ongoing, Progressing     Problem: Infection  Goal: Absence of Infection Signs and Symptoms  Outcome: Ongoing, Progressing     Problem: Breastfeeding  Goal: Effective Breastfeeding  Outcome: Ongoing, Progressing     Problem: Adjustment to Role Transition (Postpartum Vaginal Delivery)  Goal: Successful Maternal Role Transition  Outcome: Ongoing, Progressing     Problem: Bleeding (Postpartum Vaginal Delivery)  Goal: Hemostasis  Outcome: Ongoing, Progressing     Problem: Infection (Postpartum Vaginal Delivery)  Goal: Absence of Infection Signs/Symptoms  Outcome: Ongoing, Progressing     Problem: Pain (Postpartum Vaginal Delivery)  Goal: Acceptable Pain Control  Outcome: Ongoing, Progressing     Problem: Urinary Retention (Postpartum Vaginal Delivery)  Goal: Effective Urinary Elimination  Outcome: Ongoing, Progressing

## 2023-04-14 NOTE — ANESTHESIA POSTPROCEDURE EVALUATION
Anesthesia Post Evaluation    Patient: Yenifer Quezada    Procedure(s) Performed: * No procedures listed *    Final Anesthesia Type: epidural      Patient location during evaluation: floor  Patient participation: Yes- Able to Participate  Level of consciousness: awake and alert  Post-procedure vital signs: reviewed and stable  Pain management: adequate  Airway patency: patent  SHELTON mitigation strategies: Use of major conduction anesthesia (spinal/epidural) or peripheral nerve block and Multimodal analgesia  PONV status at discharge: No PONV  Anesthetic complications: no      Cardiovascular status: blood pressure returned to baseline  Respiratory status: unassisted, spontaneous ventilation and room air  Hydration status: euvolemic  Follow-up not needed.          Vitals Value Taken Time   /78 04/14/23 0916   Temp 36.7 °C (98.1 °F) 04/14/23 0916   Pulse 82 04/14/23 0916   Resp 18 04/14/23 0916   SpO2 98 % 04/14/23 0916         No case tracking events are documented in the log.      Pain/Robby Score: Pain Rating Prior to Med Admin: 0 (4/14/2023 12:00 PM)

## 2023-04-14 NOTE — DISCHARGE SUMMARY
Delivery Discharge Summary  Obstetrics      Primary OB Clinician: Pamela Velázquez MD     Admission date: 2023  Discharge date: 04/15/2023    Disposition: To home, self care    Discharge Diagnosis List:  Patient Active Problem List   Diagnosis    Supervision of normal first pregnancy, antepartum    Echogenic intracardiac focus of fetus on prenatal ultrasound    Pregnancy complicated by fetal cerebral ventriculomegaly    PCOS (polycystic ovarian syndrome)    GBS (group B Streptococcus carrier), +RV culture, currently pregnant     (spontaneous vaginal delivery)       Procedure:      Hospital Course:  Yenifer Quezada is a 22 y.o. now , PPD #2 who was admitted on 2023 at 39w1d for IOL. IUP complicated by GBS positive Patient was subsequently admitted to labor and delivery unit with signed consents.     Labor course was uncomplicated and resulted in  without complications.     Please see delivery note for further details. Patient met criteria for gestational hypertension during the intrapartum period. Pre-eclampsia labs are within normal limits and patient has remained asymptomatic. Has remained normotensive in post partum period. She was started on iron for acute blood loss anemia.     Her postpartum course was uncomplicated. On discharge day, patient's pain is controlled with oral pain medications. Pt is tolerating ambulation without SOB or CP, and regular diet without N/V. Reports lochia is mild. Denies any HA, vision changes, F/C, LE swelling. Denies any breast pain/soreness.    Pt in stable condition and ready for discharge. She has been instructed to start and/or continue medications and follow up with her obstetrics provider as listed below.    Pertinent studies:  CBC  Recent Labs   Lab 23  1816 23  0527   WBC 10.67 8.71   HGB 11.0* 9.1*   HCT 33.2* 27.8*   MCV 90 91    180      Temp:  [97.4 °F (36.3 °C)-98.1 °F (36.7 °C)] 97.4 °F (36.3 °C)  Pulse:  [62-82] 62  Resp:   [16-18] 18  SpO2:  [98 %-99 %] 98 %  BP: (113-131)/(72-82) 131/82    Immunization History   Administered Date(s) Administered    DTaP 2002, 07/26/2007, 08/23/2007, 11/15/2008    HIB 2002    HPV Quadrivalent 08/06/2013, 11/06/2013    Hepatitis B, Pediatric/Adolescent 2002, 2002, 07/26/2007    IPV 2002, 07/26/2007, 08/23/2007    Influenza 11/15/2008, 11/06/2013    Influenza - Quadrivalent - PF *Preferred* (6 months and older) 12/13/2022    Influenza - Trivalent (ADULT) 11/15/2008    Influenza Split 11/06/2013    MMRV 07/26/2007, 08/23/2007    Meningococcal Conjugate 08/06/2013    Meningococcal Conjugate (MCV4O) 08/06/2013    Pneumococcal Conjugate - 7 Valent 2002    Tdap 08/06/2013, 01/24/2023        Delivery:    Episiotomy: None   Lacerations: None   Repair suture: None   Repair # of packets: 1   Blood loss (ml):       Birth information:  YOB: 2021   Time of birth: 11:38 AM   Sex: female   Delivery type: Vaginal, Spontaneous   Gestational Age: 39w2d    Delivery Clinician:      Other providers:       Additional  information:  Forceps:    Vacuum:    Breech:    Observed anomalies      Living?:           APGARS  One minute Five minutes Ten minutes   Skin color:         Heart rate:         Grimace:         Muscle tone:         Breathing:         Totals: 8  9        Placenta: Delivered:       appearance    Patient Instructions:   Current Discharge Medication List        START taking these medications    Details   docusate sodium (COLACE) 100 MG capsule Take 2 capsules (200 mg total) by mouth 2 (two) times daily as needed for Constipation.  Qty: 30 capsule, Refills: 1      ferrous sulfate 325 (65 FE) MG EC tablet Take 1 tablet (325 mg total) by mouth once daily.  Qty: 60 tablet, Refills: 3      ibuprofen (ADVIL,MOTRIN) 600 MG tablet Take 1 tablet (600 mg total) by mouth every 6 (six) hours as needed for Pain.  Qty: 30 tablet, Refills: 1      norethindrone (MICRONOR) 0.35 mg  tablet Take 1 tablet (0.35 mg total) by mouth once daily.  Qty: 90 tablet, Refills: 4           STOP taking these medications       ondansetron (ZOFRAN) 4 MG tablet Comments:   Reason for Stopping:         prenatal 105-iron-folic ac-dha 30 mg iron- 1.4 mg-300 mg Cmpk Comments:   Reason for Stopping:               Discharge Procedure Orders   Diet Adult Regular     Lifting restrictions   Order Comments: No lifting more than infant weight for 6 weeks.     No driving until:   Order Comments: Not taking narcotic medicine and feel comfortable stepping on the brakes.     Pelvic Rest   Order Comments: Nothing in vagina including intercourse for 6 weeks.     Notify your health care provider if you experience any of the following:  temperature >100.4     Notify your health care provider if you experience any of the following:  persistent nausea and vomiting or diarrhea     Notify your health care provider if you experience any of the following:  severe uncontrolled pain     Notify your health care provider if you experience any of the following:  redness, tenderness, or signs of infection (pain, swelling, redness, odor or green/yellow discharge around incision site)     Notify your health care provider if you experience any of the following:  severe persistent headache     Notify your health care provider if you experience any of the following:  persistent dizziness, light-headedness, or visual disturbances     Notify your health care provider if you experience any of the following:  increased confusion or weakness     Notify your health care provider if you experience any of the following:   Order Comments: Heavy vaginal bleeding, saturating more than 2 pads in 1 hour.     Activity as tolerated        Follow-up Information       Pamela Velázquez MD Follow up in 1 week(s).    Specialty: Obstetrics and Gynecology  Why: 1 week blood pressure check on connected moms  Contact information:  [address]  [address]  [facility]  [facility] [address]  [phone]               Pamela Velázquez MD Follow up in 6 week(s).    Specialty: Obstetrics and Gynecology  Why: 6 week post partum visit  Contact information:  [address]  [address]  [facility] [id number] [phone]                              Aylin Bailey MD   OB/GYN PGY1  [facility]

## 2023-04-14 NOTE — PLAN OF CARE
Mom VSS. Breastfeeding and formula feeding baby. Also patient was started on  a pump today. Mom is walking and voiding . Pain control with PO pain medication and family is at the bedside . And plan is to be discharged on 4/15

## 2023-04-14 NOTE — NURSING
The following message was sent to dr gaines's staff: Hi, can you please call ms negron to set up a post partum bp check appt on or before Friday 4/21. Thank you

## 2023-04-14 NOTE — TELEPHONE ENCOUNTER
----- Message from Chelsey Carver RN sent at 4/14/2023  2:36 PM CDT -----  Hi, can you please call ms negron to set up a post partum bp check appt on or before Friday 4/21. Thank you

## 2023-04-14 NOTE — LACTATION NOTE
This note was copied from a baby's chart.  Mother has requested the use of formula because infant isn't latching very well.       Education provided on normal feeding patterns in first 24 hrs of infant's life. Educated pt on indications that we look for in starting supplementation and on satiety cues from infant- for example sleeping 2-3 hrs in between feedings, stools, voids, and weight. Educated on the risk of formula feeding and risk of supplementation when breastfeeding. Listened to mothers concerns. Mother states she is seeing drops of colostrum to give to infant but that her plan was always to pump and give formula but when she mentioned a formula bottle after infant was born she didn't get a positive response from her nurse. Pt also states she has her own breast pump at bedside.    Education provided on alternative feeding methods and risk of bottles.     Honored mothers request.    Instructions given on milk storage, proper mixing, and cleaning of feeding supplies.     Questions/Concerns answered. Mother verbalized understanding.     .  Instructed on Baby led bottle feeding.  Discussed:  Wash Hands  Hunger cues - hands to mouth, bending arms and legs toward the body, sucking noises, puckered lips and rooting/searching for the nipple  Method of feeding the baby  always hold the baby upright, never prop a bottle  brush the nipple across babys upper lip and wait to open  hold bottle in a flat position, only partly full  allow baby to pause and take breaks; burp as needed  feeding lasts about 15 - 20 minutes  Stop feeding when fullness cues are present  Fullness cues - sucking slows or stops, relaxed hands and arms, pushes away, falls asleep  Formula feeding guide given and reviewed.  Pt verbalized understanding and provided appropriate recall.

## 2023-04-15 VITALS
TEMPERATURE: 97 F | RESPIRATION RATE: 16 BRPM | BODY MASS INDEX: 35.44 KG/M2 | HEIGHT: 63 IN | OXYGEN SATURATION: 98 % | WEIGHT: 200 LBS | HEART RATE: 71 BPM | DIASTOLIC BLOOD PRESSURE: 86 MMHG | SYSTOLIC BLOOD PRESSURE: 121 MMHG

## 2023-04-15 PROCEDURE — 25000003 PHARM REV CODE 250

## 2023-04-15 RX ORDER — HYDROCORTISONE 25 MG/G
CREAM TOPICAL 2 TIMES DAILY
Qty: 20 G | Refills: 0 | Status: SHIPPED | OUTPATIENT
Start: 2023-04-15 | End: 2023-05-30

## 2023-04-15 RX ORDER — ACETAMINOPHEN AND CODEINE PHOSPHATE 120; 12 MG/5ML; MG/5ML
1 SOLUTION ORAL DAILY
Qty: 90 TABLET | Refills: 4 | Status: SHIPPED | OUTPATIENT
Start: 2023-04-15 | End: 2024-04-14

## 2023-04-15 RX ORDER — HYDROCORTISONE 1 %
CREAM (GRAM) TOPICAL 2 TIMES DAILY
Status: DISCONTINUED | OUTPATIENT
Start: 2023-04-15 | End: 2023-04-15 | Stop reason: HOSPADM

## 2023-04-15 RX ADMIN — HYDROCORTISONE: 10 CREAM TOPICAL at 08:04

## 2023-04-15 RX ADMIN — DOCUSATE SODIUM 200 MG: 100 CAPSULE, LIQUID FILLED ORAL at 08:04

## 2023-04-15 RX ADMIN — FERROUS SULFATE TAB 325 MG (65 MG ELEMENTAL FE) 1 EACH: 325 (65 FE) TAB at 08:04

## 2023-04-15 RX ADMIN — IBUPROFEN 600 MG: 600 TABLET, FILM COATED ORAL at 05:04

## 2023-04-15 RX ADMIN — PRENATAL VIT W/ FE FUMARATE-FA TAB 27-0.8 MG 1 TABLET: 27-0.8 TAB at 08:04

## 2023-04-15 RX ADMIN — IBUPROFEN 600 MG: 600 TABLET, FILM COATED ORAL at 11:04

## 2023-04-15 RX ADMIN — IBUPROFEN 600 MG: 600 TABLET, FILM COATED ORAL at 12:04

## 2023-04-15 NOTE — PLAN OF CARE
Patient in no apparent distress. VSS. Ambulating without difficulty. No needs at this time. Discharge papers signed. Mother Baby care guide reviewed. All questions answered.     Problem: Adult Inpatient Plan of Care  Goal: Plan of Care Review  Outcome: Met  Goal: Patient-Specific Goal (Individualized)  Outcome: Met  Goal: Absence of Hospital-Acquired Illness or Injury  Outcome: Met  Goal: Optimal Comfort and Wellbeing  Outcome: Met  Goal: Readiness for Transition of Care  Outcome: Met     Problem:  Fall Injury Risk  Goal: Absence of Fall, Infant Drop and Related Injury  Outcome: Met     Problem: Infection  Goal: Absence of Infection Signs and Symptoms  Outcome: Met     Problem: Breastfeeding  Goal: Effective Breastfeeding  Outcome: Met     Problem: Adjustment to Role Transition (Postpartum Vaginal Delivery)  Goal: Successful Maternal Role Transition  Outcome: Met     Problem: Bleeding (Postpartum Vaginal Delivery)  Goal: Hemostasis  Outcome: Met     Problem: Infection (Postpartum Vaginal Delivery)  Goal: Absence of Infection Signs/Symptoms  Outcome: Met     Problem: Pain (Postpartum Vaginal Delivery)  Goal: Acceptable Pain Control  Outcome: Met     Problem: Urinary Retention (Postpartum Vaginal Delivery)  Goal: Effective Urinary Elimination  Outcome: Met

## 2023-04-15 NOTE — PROGRESS NOTES
POSTPARTUM PROGRESS NOTE    Subjective:     PPD/POD#: 2   Procedure:    EGA: 39w2d   N/V: No   F/C: No   Abd Pain: Mild, well-controlled with oral pain medication   Lochia: Mild   Voiding: Yes   Ambulating: Yes   Bowel fnc: Yes   Breastfeeding: Yes   Contraception: Progesterone only pills   Circumcision: N/A, female     Objective:      Temp:  [97.4 °F (36.3 °C)-98.1 °F (36.7 °C)] 97.4 °F (36.3 °C)  Pulse:  [62-82] 62  Resp:  [16-18] 18  SpO2:  [98 %-99 %] 98 %  BP: (113-131)/(72-82) 131/82    Lung: Normal respiratory effort   Abdomen: Soft, appropriately tender   Uterus: Firm, no fundal tenderness   Incision: N/A   : Deferred   Extremities: Bilateral trace edema     Lab Review    Recent Labs   Lab 23  0527      K 3.7   *   CO2 24   BUN 6   CREATININE 0.6   GLU 84   PROT 5.0*   BILITOT 0.3   ALKPHOS 103   ALT 10   AST 19         Recent Labs   Lab 23  1816 23  0527   WBC 10.67 8.71   HGB 11.0* 9.1*   HCT 33.2* 27.8*   MCV 90 91    180           I/O  No intake or output data in the 24 hours ending 04/15/23 0503       Assessment and Plan:   Postpartum care:  - Patient doing well.  - Continue routine management and advances.    Acute blood loss anemia   - H/H as above  - QBL: 100cc  - asymptomatic  - iron/colace    gHTN  - BP as above  - asymptomatic  - preE labs as above  - Mag: not indicated  - Hypertensive agent not indicated      Aylin Bailey MD   OB/GYN PGY1  [facility]

## 2023-04-17 ENCOUNTER — PATIENT MESSAGE (OUTPATIENT)
Dept: OBSTETRICS AND GYNECOLOGY | Facility: CLINIC | Age: 23
End: 2023-04-17
Payer: COMMERCIAL

## 2023-04-17 ENCOUNTER — PATIENT MESSAGE (OUTPATIENT)
Dept: OBSTETRICS AND GYNECOLOGY | Facility: OTHER | Age: 23
End: 2023-04-17
Payer: COMMERCIAL

## 2023-04-20 ENCOUNTER — PATIENT MESSAGE (OUTPATIENT)
Dept: OBSTETRICS AND GYNECOLOGY | Facility: CLINIC | Age: 23
End: 2023-04-20
Payer: COMMERCIAL

## 2023-05-30 ENCOUNTER — POSTPARTUM VISIT (OUTPATIENT)
Dept: OBSTETRICS AND GYNECOLOGY | Facility: CLINIC | Age: 23
End: 2023-05-30
Payer: COMMERCIAL

## 2023-05-30 VITALS
WEIGHT: 167.31 LBS | BODY MASS INDEX: 29.64 KG/M2 | SYSTOLIC BLOOD PRESSURE: 122 MMHG | DIASTOLIC BLOOD PRESSURE: 77 MMHG | HEIGHT: 63 IN

## 2023-05-30 DIAGNOSIS — Z30.41 ENCOUNTER FOR SURVEILLANCE OF CONTRACEPTIVE PILLS: ICD-10-CM

## 2023-05-30 DIAGNOSIS — Z12.4 ENCOUNTER FOR SCREENING FOR MALIGNANT NEOPLASM OF CERVIX: ICD-10-CM

## 2023-05-30 PROCEDURE — 99999 PR PBB SHADOW E&M-EST. PATIENT-LVL III: CPT | Mod: PBBFAC,,, | Performed by: OBSTETRICS & GYNECOLOGY

## 2023-05-30 PROCEDURE — 99999 PR PBB SHADOW E&M-EST. PATIENT-LVL III: ICD-10-PCS | Mod: PBBFAC,,, | Performed by: OBSTETRICS & GYNECOLOGY

## 2023-05-30 PROCEDURE — 0503F PR POSTPARTUM CARE VISIT: ICD-10-PCS | Mod: CPTII,S$GLB,, | Performed by: OBSTETRICS & GYNECOLOGY

## 2023-05-30 PROCEDURE — 0503F POSTPARTUM CARE VISIT: CPT | Mod: CPTII,S$GLB,, | Performed by: OBSTETRICS & GYNECOLOGY

## 2023-05-30 PROCEDURE — 88175 CYTOPATH C/V AUTO FLUID REDO: CPT

## 2023-05-30 NOTE — PROGRESS NOTES
"Past medical, surgical, social, family, and obstetric histories; medications; prior records and results; and available outside records were reviewed and updated in the EMR.  Pertinent findings were noted below.    Reason for Visit   Postpartum Care    HPI   23 y.o. female     Patient's last menstrual period was 2022 (exact date).    Delivery:  23  Ambulating, tolerating Po, moving bowels: Y  Pain controlled: Y  Bleeding: minimal   Breastfeeding: Y  Breast pain or engorgement: N  Postpartum depression: N  Contraception: POP    Pap: 2023, Done today  Mammogram: N/A  Allergies: Pcn [penicillins]    Exam   /77   Ht 5' 3" (1.6 m)   Wt 75.9 kg (167 lb 5.3 oz)   LMP 2022 (Exact Date)   Breastfeeding Yes   BMI 29.64 kg/m²     Physical Exam  Constitutional:       General: She is not in acute distress.     Appearance: Normal appearance. She is well-developed. She is not toxic-appearing.   Genitourinary:      Vulva normal.      Right Labia: No rash, lesions or Bartholin's cyst.     Left Labia: No lesions, Bartholin's cyst or rash.     No vaginal discharge or bleeding.      No cervical discharge or lesion.      Pelvic exam was performed with patient in the lithotomy position.   Pulmonary:      Effort: No respiratory distress.   Abdominal:      General: There is no distension.      Palpations: Abdomen is soft. There is no mass.      Tenderness: There is no abdominal tenderness. There is no guarding or rebound.   Exam conducted with a chaperone present.     Assessment and Plan   Encounter for postpartum care after hospital delivery  -     Liquid-Based Pap Smear, Screening    Encounter for screening for malignant neoplasm of cervix  -     Liquid-Based Pap Smear, Screening    Encounter for surveillance of contraceptive pills      Postpartum  Doing well  Exam: WNL   Mood / depression screening: mood is good   Lactation referral: N/A    Follow-up: TRICIA Guzman MD  PGY 2  Obstetrics and " Gynecology     I have reviewed the Resident's progress note.  I have personally interviewed and examined the patient at bedside and agree with the findings as documented.  All patient questions answered.  -- KALIA Schaeffer M.D.

## 2023-06-05 LAB
FINAL PATHOLOGIC DIAGNOSIS: NORMAL
Lab: NORMAL

## 2023-06-24 ENCOUNTER — PATIENT MESSAGE (OUTPATIENT)
Dept: OBSTETRICS AND GYNECOLOGY | Facility: CLINIC | Age: 23
End: 2023-06-24
Payer: COMMERCIAL

## 2023-06-24 DIAGNOSIS — N92.6 IRREGULAR UTERINE BLEEDING: Primary | ICD-10-CM

## 2024-05-20 ENCOUNTER — PATIENT MESSAGE (OUTPATIENT)
Dept: OBSTETRICS AND GYNECOLOGY | Facility: CLINIC | Age: 24
End: 2024-05-20
Payer: COMMERCIAL

## 2024-05-21 RX ORDER — NORETHINDRONE 0.35 MG/1
1 TABLET ORAL DAILY
Qty: 84 TABLET | Refills: 4 | Status: SHIPPED | OUTPATIENT
Start: 2024-05-21 | End: 2024-05-22 | Stop reason: SDUPTHER

## 2024-05-22 DIAGNOSIS — Z30.9 ENCOUNTER FOR CONTRACEPTIVE MANAGEMENT, UNSPECIFIED TYPE: Primary | ICD-10-CM

## 2024-05-22 RX ORDER — NORETHINDRONE 0.35 MG/1
1 TABLET ORAL DAILY
Qty: 84 TABLET | Refills: 4 | Status: SHIPPED | OUTPATIENT
Start: 2024-05-22 | End: 2025-07-16

## 2024-06-07 ENCOUNTER — TELEPHONE (OUTPATIENT)
Dept: OBSTETRICS AND GYNECOLOGY | Facility: CLINIC | Age: 24
End: 2024-06-07
Payer: COMMERCIAL

## 2024-07-12 ENCOUNTER — PATIENT MESSAGE (OUTPATIENT)
Dept: OBSTETRICS AND GYNECOLOGY | Facility: CLINIC | Age: 24
End: 2024-07-12
Payer: COMMERCIAL

## 2024-08-09 DIAGNOSIS — Z30.9 ENCOUNTER FOR CONTRACEPTIVE MANAGEMENT, UNSPECIFIED TYPE: ICD-10-CM

## 2024-08-10 RX ORDER — NORETHINDRONE 0.35 MG/1
1 TABLET ORAL DAILY
Qty: 84 TABLET | Refills: 0 | Status: SHIPPED | OUTPATIENT
Start: 2024-08-10

## 2024-08-10 NOTE — TELEPHONE ENCOUNTER
Refill Decision Note   Yenifer Damien  is requesting a refill authorization.  Brief Assessment and Rationale for Refill:  Approve     Medication Therapy Plan:  LOV with Dr. Schaeffer; Last med order was under Dr. Velázquez      Comments:     Note composed:8:55 AM 08/10/2024

## 2025-06-19 ENCOUNTER — OFFICE VISIT (OUTPATIENT)
Dept: PRIMARY CARE CLINIC | Facility: CLINIC | Age: 25
End: 2025-06-19
Payer: COMMERCIAL

## 2025-06-19 VITALS
HEIGHT: 62 IN | TEMPERATURE: 98 F | HEART RATE: 82 BPM | SYSTOLIC BLOOD PRESSURE: 112 MMHG | WEIGHT: 142.44 LBS | DIASTOLIC BLOOD PRESSURE: 70 MMHG | BODY MASS INDEX: 26.21 KG/M2 | RESPIRATION RATE: 16 BRPM | OXYGEN SATURATION: 97 %

## 2025-06-19 DIAGNOSIS — Z11.3 SCREEN FOR STD (SEXUALLY TRANSMITTED DISEASE): ICD-10-CM

## 2025-06-19 DIAGNOSIS — E28.2 PCOS (POLYCYSTIC OVARIAN SYNDROME): ICD-10-CM

## 2025-06-19 DIAGNOSIS — Z00.01 ENCOUNTER FOR GENERAL ADULT MEDICAL EXAMINATION WITH ABNORMAL FINDINGS: Primary | ICD-10-CM

## 2025-06-19 DIAGNOSIS — F41.1 GAD (GENERALIZED ANXIETY DISORDER): ICD-10-CM

## 2025-06-19 PROCEDURE — 99999 PR PBB SHADOW E&M-EST. PATIENT-LVL V: CPT | Mod: PBBFAC,,,

## 2025-06-19 NOTE — PROGRESS NOTES
Clinic Note  6/19/2025      Subjective:       Patient ID:  Yenifer is a 25 y.o. female being seen for a new visit.      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 Fiance   good support system ?   Sexually active- male or female partner/partners ? One male partner, unprotected   Smoke, Drink, or Illicit drugs?  Vape, occasional alcohol, denies illicit drug use     Pcos- unaware of dx, was a minor at the time, reports frequent irregularity of cycles, hair changes, bloating and sweating. Currently on Micronor oral birth control     MARVIN-reports anxiety and afraid she has adhd or ocd , family hx of adhd , anxiety, and OCD. She reports frequently worrying and over thinking, no si/hi    MARVIN-7 Anxiety Screening Tool    Over the last 2 weeks, how often have you been bothered by the following problems?   (Not at all = 0, Several Days = 1, More than half the days = 2, Nearly Every Day = 3)     1.  Feeling nervous, anxious, or on edge. 3   2.  Not being able to sleep or control worrying. 2   3.  Worrying too much about different things. 3   4.  Trouble relaxing. 3   5.  Being so restless that it is hard to sit still. 1   6.  Becoming easily annoyed or irritable. 3   7.  Feeling afraid as if something awful might happen. 2    Total score: 17      If you checked any problems, how difficult have they made it for you to do your work, take care of things at home, or get along with other people?    Very Difficult      Scoring MARVIN-7    15-21: Severe anxiety     Patient denies any other concerns today         Family History   Problem Relation Name Age of Onset    Hypertension Mother      OCD Mother      ADD / ADHD Father      Anxiety disorder  "Father      Hypertension Father      OCD Father      No Known Problems Brother          3 brothers    Breast cancer Paternal Grandmother      Colon cancer Neg Hx      Ovarian cancer Neg Hx      Uterine cancer Neg Hx       Social History[1]  Past Surgical History:   Procedure Laterality Date    KNEE SURGERY Left 09/01/2015    Dr King     KNEE SURGERY Right      Medication List with Changes/Refills   Current Medications    NORETHINDRONE (MICRONOR) 0.35 MG TABLET    Take 1 tablet (0.35 mg total) by mouth once daily.   Discontinued Medications    CLINDAMYCIN (CLEOCIN) 300 MG CAPSULE    Take 1 capsule (300 mg total) by mouth every 8 (eight) hours.     Problem List[2]  Review of Systems   Constitutional:  Negative for appetite change, fatigue, fever and unexpected weight change.   HENT:  Negative for hearing loss and sore throat.    Eyes:  Negative for pain and visual disturbance.   Respiratory:  Negative for cough, shortness of breath and wheezing.    Cardiovascular:  Negative for chest pain, palpitations and leg swelling.   Gastrointestinal:  Negative for abdominal pain, blood in stool, constipation, diarrhea, nausea and vomiting.   Genitourinary:  Positive for menstrual problem. Negative for dysuria.   Musculoskeletal:  Negative for arthralgias.   Neurological:  Negative for dizziness and headaches.   Psychiatric/Behavioral:  Negative for suicidal ideas. The patient is nervous/anxious.           Objective:      /70 (BP Location: Right arm, Patient Position: Sitting)   Pulse 82   Temp 98.3 °F (36.8 °C) (Oral)   Resp 16   Ht 5' 2" (1.575 m)   Wt 64.6 kg (142 lb 6.7 oz)   SpO2 97%   BMI 26.05 kg/m²   Estimated body mass index is 26.05 kg/m² as calculated from the following:    Height as of this encounter: 5' 2" (1.575 m).    Weight as of this encounter: 64.6 kg (142 lb 6.7 oz).  Physical Exam  Vitals and nursing note reviewed.   Constitutional:       General: She is not in acute distress.  HENT:      Head: " Atraumatic.   Cardiovascular:      Rate and Rhythm: Normal rate and regular rhythm.      Heart sounds: No murmur heard.  Pulmonary:      Effort: Pulmonary effort is normal. No respiratory distress.      Breath sounds: Normal breath sounds. No wheezing, rhonchi or rales.   Genitourinary:     Comments: Deferred on menses   Musculoskeletal:         General: Normal range of motion.      Right lower leg: No edema.      Left lower leg: No edema.   Neurological:      Mental Status: She is alert and oriented to person, place, and time.      Gait: Gait normal.   Psychiatric:         Mood and Affect: Mood normal.         Thought Content: Thought content normal.             Assessment and Plan:   MARVIN   Refer to psychiatry for MARVIN but also ocd/adhd evaluation     PCOS   Education on disease process provided   Obgyn Referral placed     Screening Fasting labs ordered today        Problem List Items Addressed This Visit       PCOS (polycystic ovarian syndrome)    Relevant Orders    Ambulatory referral/consult to Obstetrics / Gynecology    CBC Auto Differential    Comprehensive Metabolic Panel    Lipid Panel    TSH     Other Visit Diagnoses         Encounter for general adult medical examination with abnormal findings    -  Primary    Relevant Orders    CBC Auto Differential    Comprehensive Metabolic Panel    Lipid Panel    TSH    C. trachomatis/N. gonorrhoeae by AMP DNA [facility]; Urine      MARVIN (generalized anxiety disorder)        Relevant Orders    Ambulatory referral/consult to Psychiatry    CBC Auto Differential    Comprehensive Metabolic Panel    Lipid Panel    TSH      Screen for STD (sexually transmitted disease)        Relevant Orders    C. trachomatis/N. gonorrhoeae by AMP DNA [facility]; Urine            Reviewed risks, benefits, and appropriate medication use prescribed  Discussed LS changes as appropriate   Reviewed cancer screening guidelines   Advised to keep speciality and follow up appointments as scheduled   Reviewed  ER precautions   Verbalized understanding and is agreeable to plan      Follow up:   Follow up in about 6 months (around 12/19/2025), or if symptoms worsen or fail to improve.     Other Orders Placed This Visit:  Orders Placed This Encounter   Procedures    CBC Auto Differential     Standing Status:   Future     Expected Date:   6/19/2025     Expiration Date:   9/17/2026    Comprehensive Metabolic Panel     Standing Status:   Future     Expected Date:   6/19/2025     Expiration Date:   9/17/2026     Send normal result to authorizing provider's In Basket if patient is active on MyChart::   No    Lipid Panel     Standing Status:   Future     Expected Date:   6/19/2025     Expiration Date:   9/17/2026     Send normal result to authorizing provider's In Basket if patient is active on MyChart::   No    TSH     Standing Status:   Future     Expected Date:   6/19/2025     Expiration Date:   9/17/2026     Send normal result to authorizing provider's In Basket if patient is active on MyChart::   No    C. trachomatis/N. gonorrhoeae by AMP DNA [facility]; Urine     Standing Status:   Future     Expected Date:   6/19/2025     Expiration Date:   8/18/2026     Sources by Resulting Lab::   [facility]     Source::   Urine    Ambulatory referral/consult to Obstetrics / Gynecology     Standing Status:   Future     Expected Date:   6/26/2025     Expiration Date:   7/19/2026     Referral Priority:   Routine     Referral Type:   Consultation     Referral Reason:   Specialty Services Required     Requested Specialty:   Obstetrics and Gynecology     Number of Visits Requested:   1    Ambulatory referral/consult to Psychiatry     Standing Status:   Future     Expected Date:   6/26/2025     Expiration Date:   7/19/2026     Referral Priority:   Routine     Referral Type:   Psychiatric     Referral Reason:   Specialty Services Required     Requested Specialty:   Psychiatry     Number of Visits Requested:   1           DARSHAN Vora               [1]   Social History  Socioeconomic History    Marital status: Single   Tobacco Use    Smoking status: Every Day     Types: Vaping with nicotine    Smokeless tobacco: Never   Substance and Sexual Activity    Alcohol use: Yes     Comment: socially    Drug use: No    Sexual activity: Yes     Partners: Male   [2]   Patient Active Problem List  Diagnosis    PCOS (polycystic ovarian syndrome)

## 2025-06-20 ENCOUNTER — RESULTS FOLLOW-UP (OUTPATIENT)
Dept: PRIMARY CARE CLINIC | Facility: CLINIC | Age: 25
End: 2025-06-20
Payer: COMMERCIAL

## 2025-07-31 ENCOUNTER — PATIENT MESSAGE (OUTPATIENT)
Dept: OBSTETRICS AND GYNECOLOGY | Facility: CLINIC | Age: 25
End: 2025-07-31
Payer: COMMERCIAL

## (undated) DEVICE — PAD CAST SPECIALIST STRL 6

## (undated) DEVICE — SUT FIBERWIRE LOOP TIGER 2

## (undated) DEVICE — PAD COLD THERAPY KNEE WRAP ON

## (undated) DEVICE — SEE MEDLINE ITEM 146231

## (undated) DEVICE — PAD ABD 8X10 STERILE

## (undated) DEVICE — SOL IRR NACL .9% 3000ML

## (undated) DEVICE — BIT DRILL ACL TIGHTROPE 4MM

## (undated) DEVICE — SEE MEDLINE ITEM 152530

## (undated) DEVICE — ALCOHOL 70% ISOP W/GREEN 16OZ

## (undated) DEVICE — COVER MAYO STAND REINFRCD 30

## (undated) DEVICE — PENCIL ELECTROSURG HOLST W/BLD

## (undated) DEVICE — DRESSING XEROFORM FOIL PK 1X8

## (undated) DEVICE — CLOSURE SKIN STERI STRIP 1/2X4

## (undated) DEVICE — Device

## (undated) DEVICE — GOWN B1 X-LG X-LONG

## (undated) DEVICE — UNDERGLOVES BIOGEL PI SIZE 8.5

## (undated) DEVICE — TUBE SET INFLOW/OUTFLOW

## (undated) DEVICE — TOURNIQUET SB QC SP 34X4IN

## (undated) DEVICE — BLADE GREAT WHITE 4.2 6/BX

## (undated) DEVICE — SEE MEDLINE ITEM 157126

## (undated) DEVICE — GAUZE SPONGE 4X4 12PLY

## (undated) DEVICE — APPLICATOR CHLORAPREP ORN 26ML

## (undated) DEVICE — NDL MAYO CAT 1/2 CIR #5

## (undated) DEVICE — POSITIONER IV ARMBOARD FOAM

## (undated) DEVICE — SOL 9P NACL IRR PIC IL

## (undated) DEVICE — SEE MEDLINE ITEM 157169

## (undated) DEVICE — SPONGE GAUZE 16PLY 4X4

## (undated) DEVICE — DRESSING XEROFORM 1X8IN

## (undated) DEVICE — ADHESIVE MASTISOL VIAL 48/BX

## (undated) DEVICE — SEE MEDLINE ITEM 153151

## (undated) DEVICE — SKIN MARKER DEVON 160

## (undated) DEVICE — SUT MCRYL PLUS 4-0 PS2 27IN

## (undated) DEVICE — GLOVE BIOGEL SKINSENSE PI 8.0

## (undated) DEVICE — PADDING CAST 6 INCH

## (undated) DEVICE — GLOVE BIOGEL SKINSENSE PI 7.5

## (undated) DEVICE — SUT VICRYL PLUS 3-0 SH 18IN

## (undated) DEVICE — BLADE SURG STAINLESS STEEL #15

## (undated) DEVICE — KIT ACL DISP W/O SAW BLADE

## (undated) DEVICE — UNDERGLOVES BIOGEL PI SIZE 8

## (undated) DEVICE — SEE MEDLINE ITEM 152523